# Patient Record
Sex: MALE | Race: WHITE
[De-identification: names, ages, dates, MRNs, and addresses within clinical notes are randomized per-mention and may not be internally consistent; named-entity substitution may affect disease eponyms.]

---

## 2020-08-12 ENCOUNTER — HOSPITAL ENCOUNTER (INPATIENT)
Dept: HOSPITAL 7 - FB.MS | Age: 50
LOS: 33 days | Discharge: HOME | DRG: 638 | End: 2020-09-14
Attending: FAMILY MEDICINE | Admitting: FAMILY MEDICINE
Payer: MEDICARE

## 2020-08-12 DIAGNOSIS — E11.42: ICD-10-CM

## 2020-08-12 DIAGNOSIS — I96: ICD-10-CM

## 2020-08-12 DIAGNOSIS — N17.9: ICD-10-CM

## 2020-08-12 DIAGNOSIS — Z89.421: ICD-10-CM

## 2020-08-12 DIAGNOSIS — M86.8X7: ICD-10-CM

## 2020-08-12 DIAGNOSIS — F17.210: ICD-10-CM

## 2020-08-12 DIAGNOSIS — I10: ICD-10-CM

## 2020-08-12 DIAGNOSIS — E11.52: ICD-10-CM

## 2020-08-12 DIAGNOSIS — Z79.4: ICD-10-CM

## 2020-08-12 DIAGNOSIS — E11.69: Primary | ICD-10-CM

## 2020-08-12 DIAGNOSIS — E11.51: ICD-10-CM

## 2020-08-12 DIAGNOSIS — F10.21: ICD-10-CM

## 2020-08-12 DIAGNOSIS — Z79.899: ICD-10-CM

## 2020-08-12 DIAGNOSIS — Z91.19: ICD-10-CM

## 2020-08-12 DIAGNOSIS — J44.9: ICD-10-CM

## 2020-08-12 RX ADMIN — INSULIN GLARGINE SCH UNITS: 100 INJECTION, SOLUTION SUBCUTANEOUS at 20:57

## 2020-08-12 RX ADMIN — HEPARIN SODIUM (PORCINE) LOCK FLUSH IV SOLN 100 UNIT/ML SCH UNITS: 100 SOLUTION at 18:51

## 2020-08-12 NOTE — PCM.HP.2
H&P History of Present Illness





- General


Date of Service: 08/12/20


Admit Problem/Dx: 


                           Admission Diagnosis/Problem





Admission Diagnosis/Problem      Osteomyelitis of toe of right foot








Source of Information: Patient, Old Records, Provider





- History of Present Illness


Initial Comments - Free Text/Narative: 


Shyam present for swing bed admission for right 2nd toe osteomyelitis for IV 

antibiotics until Sept 14. He was admitted to [facility] on Aug 1st-12th, had

cut his right 2nd toenail the week prior to admission in [place], came in with 

swollen red toe. Started on Zosyn & Vancomycin, Ciprofloxacin & Flagyl in ER. Cr

was 0.81 on admission, climbed to 1.66, Vancomycin trough was 32.7 on 8/4, was 

getting 1250 mg every 8 hours since admission, dose was held on 8/5, random 

vanco trough was 14.7, current dose of Vancomycin 1250 mg every 24 hrs was 

started on 8/6 to present. Last vancomycin trough was today before 1149 dose, 

came back at 13.4. Goal initially was 15-20, then changed to 10-15 for his renal

function. Discharge paperwork states 15-20 goal but pharmacy will verify this. 

His creatinine today was 1.4. He underwent amputation of right 2nd toe on 8/3, 

dressing changed by podiatry today, has appt with them on 8/18 at 840am, 

dressing to stay in place until seen by podiatry. He is weight bearing with 

postop shoe. He was evaluated by PT/OT did not require any services. Follow up 

with Infectious disease on 9/2 at 1420. Also has nephrology appt on 8/19 at 1130

am and family medicine follow up on 8/19 at 1345. His last bowel movement was 

today. Eating and drinking well. Blood pressures were uncontrolled initially in 

[place] but controlled with Amlodipine and Coreg on discharge. Had PICC line 

placed at discharge by Infectious disease. Pain is controlled with 

Hydrocodone/APAP. 





- Related Data


Allergies/Adverse Reactions: 


                                    Allergies











Allergy/AdvReac Type Severity Reaction Status Date / Time


 


No Known Allergies Allergy   Verified 08/12/20 17:18











Home Medications: 


                                    Home Meds





Albuterol Sulfate [Albuterol Sulfate Hfa] 1 puff INH Q4H PRN 08/12/20 [History]


Cefepime [Maxipime] 2 gm IVPUSH Q8H 08/12/20 [History]


Docusate Sodium/Sennosides [Senokot-S] 1 tab PO BID 08/12/20 [History]


Heparin Sodium,Porcine/PF [Heparin Lock Flush 100 Unit/ml] 300 unit IV 

ASDIRECTED PRN 08/12/20 [History]


Hydrocodone/Acetaminophen [Hydrocodon-Acetaminophen 5-325] 1 tab PO Q6H PRN 

08/12/20 [History]


Insulin Glarg,Human.Rec.Analog [Lantus Solostar] 15 unit SQ BID 08/12/20 

[History]


Vancomycin/0.9 % Sod Chloride [Vanco 1.25 gm/250 ml-0.9% NaCl] 1.25 gm IV Q24H 

08/12/20 [History]


amLODIPine Besylate [Amlodipine Besylate] 10 mg PO DAILY 08/12/20 [History]


carvediloL [Coreg] 25 mg PO BIDMEALS 08/12/20 [History]


metroNIDAZOLE [Metronidazole] 500 mg PO TID 08/12/20 [History]


polyethylene glycoL 3350 [MiraLAX] 17 gm PO DAILY 08/12/20 [History]











Past Medical History


Endocrine/Metabolic History: Reports: Diabetes, Type II





- Past Surgical History


Musculoskeletal Surgical History: Reports: Amputation (right 2nd toe 8/3/2020)





Social & Family History





- Tobacco Use


Smoking Status *Q: Current Every Day Smoker





- Alcohol Use


Alcohol Use History: Yes





H&P Review of Systems





- Review of Systems:


Review Of Systems: See Below


General: Reports: No Symptoms


HEENT: Reports: No Symptoms


Pulmonary: Reports: No Symptoms


Cardiovascular: Reports: No Symptoms


Gastrointestinal: Reports: No Symptoms


Genitourinary: Reports: No Symptoms


Musculoskeletal: Reports: Foot Pain, Joint Pain (hands)


Skin: Reports: Wound (right toe)


Psychiatric: Reports: No Symptoms


Neurological: Reports: No Symptoms


Hematologic/Lymphatic: Reports: No Symptoms


Immunologic: Reports: No Symptoms





Exam





- Exam


Exam: See Below





- Exam


General: Alert, Oriented, Cooperative.  No: Mild Distress


HEENT: PERRLA, Conjunctiva Clear, EOMI, Hearing Intact, Posterior Pharynx Clear


Neck: Supple, Trachea Midline


Lungs: Clear to Auscultation, Normal Respiratory Effort


Cardiovascular: Regular Rate, Regular Rhythm


GI/Abdominal Exam: Normal Bowel Sounds, Soft, Non-Tender, No Distention


 (Male) Exam: Deferred


Rectal (Males) Exam: Deferred


Extremities: No Pedal Edema, Other (post-op shoe on right, dressing in place.)


Peripheral Pulses: 2+: Radial (L), Radial (R)


Skin: Wound (right 2nd toe)


Psychiatric: Normal Affect, Normal Mood





*Q Meaningful Use (ADM)





- VTE Risk Assess *Q


Each Risk Factor Represents 1 Point: Age 41 - 59 years, History of prior major 

surgery less than 1 month


Total Score 1 Point Risk Factors: 2


Each Risk Factor Represents 2 Points: None


Total Score 2 Point Risk Factors: 0


Each Risk Factor Represents 3 Points: None


Total Score 3 Point Risk Factors: 0


Each Risk Factor Represents 5 Points: None


Total Score 5 Point Risk Factors: 0


Venous Thromboembolism Risk Factor Score *Q: 2





- Problem List


(1) Osteomyelitis of second toe of right foot


SNOMED Code(s): 567997554, 548057142, 6817301940615266


   ICD Code: M86.9 - OSTEOMYELITIS, UNSPECIFIED   Status: Acute   Current Visit:

 Yes   





(2) Status post amputation of toe of right foot


SNOMED Code(s): 03969971833010399, 52953786807747082


   ICD Code: Z89.421 - ACQUIRED ABSENCE OF OTHER RIGHT TOE(S)   Status: Acute   

Current Visit: Yes   Onset Date: ~08/03/20   





(3) Diabetes


SNOMED Code(s): 16356455


   ICD Code: E11.9 - TYPE 2 DIABETES MELLITUS WITHOUT COMPLICATIONS   Status: 

Chronic   Current Visit: Yes   


Qualifiers: 


   Diabetes mellitus type: type 2   Diabetes mellitus long term insulin use: 

unspecified long term insulin use status 





(4) History of alcohol dependence


SNOMED Code(s): 067798290


   ICD Code: F10.21 - ALCOHOL DEPENDENCE, IN REMISSION   Status: Chronic   

Current Visit: Yes   





(5) Acute kidney injury


SNOMED Code(s): 48380712, 38912617


   ICD Code: N17.9 - ACUTE KIDNEY FAILURE, UNSPECIFIED   Status: Acute   Current

 Visit: Yes   Onset Date: ~08/04/20   





(6) Hypertension


SNOMED Code(s): 48382874


   ICD Code: I10 - ESSENTIAL (PRIMARY) HYPERTENSION   Status: Chronic   Current 

Visit: Yes   


Problem List Initiated/Reviewed/Updated: Yes


Orders Last 24hrs: 


                               Active Orders 24 hr











 Category Date Time Status


 


 Patient Status [ADT] Routine ADT  08/12/20 16:30 Active


 


 Accu Check [Blood Glucose Check, Bedside] [RC] BIDMEALS Care  08/12/20 16:37 

Active


 


 Ambulate [RC] PER UNIT ROUTINE Care  08/12/20 16:32 Active


 


 Height and Weight [RC] WEEKLY Care  08/12/20 16:30 Active


 


 Oxygen Therapy [RC] PRN Care  08/12/20 16:30 Active


 


 RT Post Treatment Assessment [RC] Click to Edit Care  08/12/20 16:48 Ordered


 


 Up ad [place] [RC] ASDIRECTED Care  08/12/20 16:30 Active


 


 VTE/DVT Education [RC] Per Unit Routine Care  08/12/20 16:30 Active


 


 Vital Signs [RC] PER UNIT ROUTINE Care  08/12/20 16:30 Active


 


 Consistent Carbohydrate Diet [DIET] Diet  08/12/20 Dinner Active


 


 ALANINE AMINOTRANSFERASE,ALT [CHEM] WEEKLY Lab  08/17/20 06:00 Ordered


 


 ALANINE AMINOTRANSFERASE,ALT [CHEM] WEEKLY Lab  08/24/20 06:00 Ordered


 


 ALANINE AMINOTRANSFERASE,ALT [CHEM] WEEKLY Lab  08/31/20 06:00 Ordered


 


 ALANINE AMINOTRANSFERASE,ALT [CHEM] WEEKLY Lab  09/07/20 06:00 Ordered


 


 ALANINE AMINOTRANSFERASE,ALT [CHEM] WEEKLY Lab  09/14/20 06:00 Ordered


 


 C-REACTIVE PROTEIN [CHEM] WEEKLY Lab  08/17/20 06:00 Ordered


 


 C-REACTIVE PROTEIN [CHEM] WEEKLY Lab  08/24/20 06:00 Ordered


 


 C-REACTIVE PROTEIN [CHEM] WEEKLY Lab  08/31/20 06:00 Ordered


 


 C-REACTIVE PROTEIN [CHEM] WEEKLY Lab  09/07/20 06:00 Ordered


 


 C-REACTIVE PROTEIN [CHEM] WEEKLY Lab  09/14/20 06:00 Ordered


 


 CBC WITH AUTO DIFF [HEME] WEEKLY Lab  08/17/20 06:00 Ordered


 


 CBC WITH AUTO DIFF [HEME] WEEKLY Lab  08/24/20 06:00 Ordered


 


 CBC WITH AUTO DIFF [HEME] WEEKLY Lab  08/31/20 06:00 Ordered


 


 CBC WITH AUTO DIFF [HEME] WEEKLY Lab  09/07/20 06:00 Ordered


 


 CBC WITH AUTO DIFF [HEME] WEEKLY Lab  09/14/20 06:00 Ordered


 


 CREATININE, URINE WEEKLY Lab  08/17/20 06:00 Ordered


 


 CREATININE, URINE WEEKLY Lab  08/24/20 06:00 Ordered


 


 CREATININE, URINE WEEKLY Lab  08/31/20 06:00 Ordered


 


 CREATININE, URINE WEEKLY Lab  09/07/20 06:00 Ordered


 


 CREATININE, URINE WEEKLY Lab  09/14/20 06:00 Ordered


 


 SEDIMENTATION RATE MANUAL [HEME] WEEKLY Lab  08/17/20 06:00 Ordered


 


 SEDIMENTATION RATE MANUAL [HEME] WEEKLY Lab  08/24/20 06:00 Ordered


 


 SEDIMENTATION RATE MANUAL [HEME] WEEKLY Lab  08/31/20 06:00 Ordered


 


 SEDIMENTATION RATE MANUAL [HEME] WEEKLY Lab  09/07/20 06:00 Ordered


 


 SEDIMENTATION RATE MANUAL [HEME] WEEKLY Lab  09/14/20 06:00 Ordered


 


 Acetaminophen/HYDROcodone [Norco 325-5 MG] Med  08/12/20 16:47 Ordered





 1 tab PO Q6H PRN   


 


 Albuterol [Ventolin HFA] Med  08/12/20 16:47 Ordered





 1 puff INH Q4H PRN   


 


 Cefepime [Maxipime] Med  08/12/20 17:00 Ordered





 2 gm IVPUSH Q8H   


 


 Docusate Sodium/Sennosides [Senna Plus] Med  08/12/20 21:00 Ordered





 1 tab PO BID   


 


 Heparin Sodium [Heparin Lock Flush 100 Units/ML] Med  08/12/20 16:57 Active





 300 units IVPUSH ASDIRECTED PRN   


 


 Heparin Sodium [Heparin Lock Flush 100 Units/ML] Med  08/13/20 12:00 Active





 300 units IVPUSH Q24H   


 


 Heparin Sodium,Porcine/PF [Heparin Lock Flush 100 Unit/ Med  08/12/20 16:47 

Ordered





 ml]   





 300 unit IV ASDIRECTED PRN   


 


 Insulin Glarg,Human.Rec.Analog [LantUS Solostar] Med  08/12/20 21:00 Ordered





 15 units SUBCUT BID   


 


 Vancomycin 1.25 gm Med  08/13/20 12:00 Active





 Sodium Chloride 0.9% [Normal Saline (AdvBag)] 250 ml   





 IV Q24H   


 


 amLODIPine [Norvasc] Med  08/13/20 09:00 Ordered





 10 mg PO DAILY   


 


 carvediloL [Coreg] Med  08/12/20 18:00 Ordered





 25 mg PO BIDMEALS   


 


 metroNIDAZOLE [Flagyl] Med  08/12/20 16:32 Ordered





 500 mg PO TID   


 


 polyethylene glycoL 3350 [MiraLAX] Med  08/13/20 09:00 Ordered





 17 gm PO DAILY   


 


 Resuscitation Status Routine Resus Stat  08/12/20 16:30 Ordered








                                Medication Orders





Hydrocodone Bitart/Acetaminophen (Norco 325-5 Mg)  1 tab PO Q6H PRN


   PRN Reason: Pain (severe 7-10)


Albuterol (Ventolin Hfa)  0 gm INH Q4H PRN


   PRN Reason: SHORTNESS OF BREATH


Amlodipine Besylate (Norvasc)  10 mg PO DAILY [facility]


Carvedilol (Coreg)  25 mg PO BIDMEALS [facility]


Cefepime HCl (Maxipime)  2 gm IVPUSH Q8H [facility]


Heparin Sodium (Porcine) (Heparin Lock Flush 100 Units/Ml)  300 units IVPUSH Q8H

 [facility]


Heparin Sodium (Porcine) (Heparin Lock Flush 100 Units/Ml)  300 units IVPUSH 

Q24H [facility]


Heparin Sodium (Porcine) (Heparin Lock Flush 100 Units/Ml)  300 units IVPUSH 

ASDIRECTED PRN


   PRN Reason: FLUSH


Vancomycin HCl 1.25 gm/ Sodium (Chloride)  250 mls @ 125 mls/hr IV Q24H [facility]


Insulin Glargine (Lantus Solostar)  15 units SUBCUT BID [facility]


Metronidazole (Flagyl)  500 mg PO Q8H [facility]


Polyethylene Glycol (Miralax)  17 gm PO DAILY [facility]


Senna/Docusate Sodium (Senna Plus)  1 tab PO BID [facility]








Assessment/Plan Comment:: 


1. Admit to swingbed for IV Vancomycin 1250 mg q24h, dosing per pharmacy 

protocol; Cefepime 2 gram IV q8h and Flagyl 500 mg po q8hr until Sept 14.


2. CBC, Creatinine, ALT, ESR, & CRP weekly on Monday, fax to Infectious Disease.


3. Vanco trough tomorrow before next dose and pharmacy will verify with [place] on

 goal ranges as two different ones are listed.


4. Consistent Carb diet, Accuchecks bid. Lantus 15 units bid.


5. Weightbearing with post-op shoe, activity up ad sakina.


6. Dressing change next week with podiatry. Appts listed in HPI and in physical 

chart. [place] is going to try and coordinate his appointments for one 

day instead of have 1 on 18th and 2 on 19th. 


7. DVT prophylaxis: ambulate.


8. FULL CODE.


9. Adjust treatments as necessary.








- Mortality Measure


Prognosis:: Good

## 2020-08-13 RX ADMIN — HEPARIN SODIUM (PORCINE) LOCK FLUSH IV SOLN 100 UNIT/ML SCH UNITS: 100 SOLUTION at 17:29

## 2020-08-13 RX ADMIN — INSULIN GLARGINE SCH UNITS: 100 INJECTION, SOLUTION SUBCUTANEOUS at 21:30

## 2020-08-13 RX ADMIN — HEPARIN SODIUM (PORCINE) LOCK FLUSH IV SOLN 100 UNIT/ML SCH UNITS: 100 SOLUTION at 08:52

## 2020-08-13 RX ADMIN — INSULIN GLARGINE SCH UNITS: 100 INJECTION, SOLUTION SUBCUTANEOUS at 08:42

## 2020-08-13 RX ADMIN — Medication PRN ML: at 14:29

## 2020-08-13 RX ADMIN — HEPARIN SODIUM (PORCINE) LOCK FLUSH IV SOLN 100 UNIT/ML SCH UNITS: 100 SOLUTION at 01:59

## 2020-08-13 RX ADMIN — Medication PRN ML: at 02:01

## 2020-08-13 RX ADMIN — Medication PRN ML: at 08:53

## 2020-08-14 RX ADMIN — HEPARIN SODIUM (PORCINE) LOCK FLUSH IV SOLN 100 UNIT/ML SCH UNITS: 100 SOLUTION at 14:08

## 2020-08-14 RX ADMIN — INSULIN GLARGINE SCH UNITS: 100 INJECTION, SOLUTION SUBCUTANEOUS at 09:19

## 2020-08-14 RX ADMIN — Medication PRN ML: at 17:58

## 2020-08-14 RX ADMIN — Medication PRN ML: at 14:07

## 2020-08-14 RX ADMIN — HEPARIN SODIUM (PORCINE) LOCK FLUSH IV SOLN 100 UNIT/ML SCH UNITS: 100 SOLUTION at 17:58

## 2020-08-14 RX ADMIN — INSULIN GLARGINE SCH UNITS: 100 INJECTION, SOLUTION SUBCUTANEOUS at 21:31

## 2020-08-14 RX ADMIN — Medication PRN ML: at 00:51

## 2020-08-14 RX ADMIN — Medication PRN ML: at 12:32

## 2020-08-14 RX ADMIN — HEPARIN SODIUM (PORCINE) LOCK FLUSH IV SOLN 100 UNIT/ML SCH UNITS: 100 SOLUTION at 09:16

## 2020-08-14 RX ADMIN — Medication PRN ML: at 09:01

## 2020-08-14 RX ADMIN — HEPARIN SODIUM (PORCINE) LOCK FLUSH IV SOLN 100 UNIT/ML SCH UNITS: 100 SOLUTION at 00:49

## 2020-08-15 RX ADMIN — Medication PRN ML: at 00:22

## 2020-08-15 RX ADMIN — HEPARIN SODIUM (PORCINE) LOCK FLUSH IV SOLN 100 UNIT/ML SCH UNITS: 100 SOLUTION at 17:43

## 2020-08-15 RX ADMIN — HEPARIN SODIUM (PORCINE) LOCK FLUSH IV SOLN 100 UNIT/ML SCH UNITS: 100 SOLUTION at 01:01

## 2020-08-15 RX ADMIN — Medication PRN ML: at 14:36

## 2020-08-15 RX ADMIN — INSULIN GLARGINE SCH UNITS: 100 INJECTION, SOLUTION SUBCUTANEOUS at 08:04

## 2020-08-15 RX ADMIN — HEPARIN SODIUM (PORCINE) LOCK FLUSH IV SOLN 100 UNIT/ML SCH UNITS: 100 SOLUTION at 16:08

## 2020-08-15 RX ADMIN — Medication PRN ML: at 16:08

## 2020-08-15 RX ADMIN — HEPARIN SODIUM (PORCINE) LOCK FLUSH IV SOLN 100 UNIT/ML SCH UNITS: 100 SOLUTION at 08:16

## 2020-08-15 RX ADMIN — Medication PRN ML: at 17:43

## 2020-08-15 RX ADMIN — INSULIN GLARGINE SCH UNITS: 100 INJECTION, SOLUTION SUBCUTANEOUS at 21:33

## 2020-08-15 RX ADMIN — Medication PRN ML: at 17:39

## 2020-08-15 RX ADMIN — Medication PRN ML: at 08:10

## 2020-08-16 RX ADMIN — Medication PRN ML: at 08:56

## 2020-08-16 RX ADMIN — Medication PRN ML: at 17:44

## 2020-08-16 RX ADMIN — Medication PRN ML: at 09:00

## 2020-08-16 RX ADMIN — Medication PRN ML: at 13:04

## 2020-08-16 RX ADMIN — HEPARIN SODIUM (PORCINE) LOCK FLUSH IV SOLN 100 UNIT/ML SCH UNITS: 100 SOLUTION at 09:00

## 2020-08-16 RX ADMIN — Medication PRN ML: at 17:38

## 2020-08-16 RX ADMIN — Medication PRN ML: at 09:03

## 2020-08-16 RX ADMIN — INSULIN GLARGINE SCH UNITS: 100 INJECTION, SOLUTION SUBCUTANEOUS at 08:55

## 2020-08-16 RX ADMIN — Medication PRN ML: at 00:48

## 2020-08-16 RX ADMIN — Medication PRN ML: at 14:45

## 2020-08-16 RX ADMIN — HEPARIN SODIUM (PORCINE) LOCK FLUSH IV SOLN 100 UNIT/ML SCH UNITS: 100 SOLUTION at 14:45

## 2020-08-16 RX ADMIN — INSULIN GLARGINE SCH UNITS: 100 INJECTION, SOLUTION SUBCUTANEOUS at 21:21

## 2020-08-16 RX ADMIN — HEPARIN SODIUM (PORCINE) LOCK FLUSH IV SOLN 100 UNIT/ML SCH UNITS: 100 SOLUTION at 17:44

## 2020-08-16 RX ADMIN — HEPARIN SODIUM (PORCINE) LOCK FLUSH IV SOLN 100 UNIT/ML SCH UNITS: 100 SOLUTION at 00:52

## 2020-08-17 RX ADMIN — INSULIN GLARGINE SCH UNITS: 100 INJECTION, SOLUTION SUBCUTANEOUS at 20:53

## 2020-08-17 RX ADMIN — HEPARIN SODIUM (PORCINE) LOCK FLUSH IV SOLN 100 UNIT/ML SCH UNITS: 100 SOLUTION at 13:15

## 2020-08-17 RX ADMIN — Medication PRN ML: at 00:19

## 2020-08-17 RX ADMIN — INSULIN GLARGINE SCH UNITS: 100 INJECTION, SOLUTION SUBCUTANEOUS at 08:39

## 2020-08-17 RX ADMIN — Medication PRN ML: at 12:50

## 2020-08-17 RX ADMIN — HEPARIN SODIUM (PORCINE) LOCK FLUSH IV SOLN 100 UNIT/ML SCH UNITS: 100 SOLUTION at 00:20

## 2020-08-17 RX ADMIN — HEPARIN SODIUM (PORCINE) LOCK FLUSH IV SOLN 100 UNIT/ML SCH UNITS: 100 SOLUTION at 08:49

## 2020-08-17 RX ADMIN — Medication PRN ML: at 08:49

## 2020-08-17 RX ADMIN — Medication PRN ML: at 17:26

## 2020-08-17 RX ADMIN — HEPARIN SODIUM (PORCINE) LOCK FLUSH IV SOLN 100 UNIT/ML SCH UNITS: 100 SOLUTION at 17:30

## 2020-08-18 RX ADMIN — Medication PRN ML: at 17:49

## 2020-08-18 RX ADMIN — HEPARIN SODIUM (PORCINE) LOCK FLUSH IV SOLN 100 UNIT/ML SCH UNITS: 100 SOLUTION at 13:50

## 2020-08-18 RX ADMIN — INSULIN GLARGINE SCH UNITS: 100 INJECTION, SOLUTION SUBCUTANEOUS at 08:17

## 2020-08-18 RX ADMIN — INSULIN GLARGINE SCH UNITS: 100 INJECTION, SOLUTION SUBCUTANEOUS at 21:33

## 2020-08-18 RX ADMIN — HEPARIN SODIUM (PORCINE) LOCK FLUSH IV SOLN 100 UNIT/ML SCH UNITS: 100 SOLUTION at 01:16

## 2020-08-18 RX ADMIN — HEPARIN SODIUM (PORCINE) LOCK FLUSH IV SOLN 100 UNIT/ML SCH UNITS: 100 SOLUTION at 17:52

## 2020-08-18 RX ADMIN — Medication PRN ML: at 13:50

## 2020-08-18 RX ADMIN — HEPARIN SODIUM (PORCINE) LOCK FLUSH IV SOLN 100 UNIT/ML SCH UNITS: 100 SOLUTION at 08:19

## 2020-08-19 RX ADMIN — INSULIN GLARGINE SCH UNITS: 100 INJECTION, SOLUTION SUBCUTANEOUS at 20:49

## 2020-08-19 RX ADMIN — Medication PRN ML: at 17:12

## 2020-08-19 RX ADMIN — HEPARIN SODIUM (PORCINE) LOCK FLUSH IV SOLN 100 UNIT/ML SCH UNITS: 100 SOLUTION at 09:20

## 2020-08-19 RX ADMIN — Medication PRN ML: at 15:22

## 2020-08-19 RX ADMIN — Medication PRN ML: at 12:43

## 2020-08-19 RX ADMIN — INSULIN GLARGINE SCH UNITS: 100 INJECTION, SOLUTION SUBCUTANEOUS at 09:21

## 2020-08-19 RX ADMIN — Medication PRN ML: at 00:59

## 2020-08-19 RX ADMIN — Medication PRN ML: at 09:15

## 2020-08-19 RX ADMIN — Medication PRN ML: at 09:20

## 2020-08-19 RX ADMIN — HEPARIN SODIUM (PORCINE) LOCK FLUSH IV SOLN 100 UNIT/ML SCH UNITS: 100 SOLUTION at 17:11

## 2020-08-19 RX ADMIN — HEPARIN SODIUM (PORCINE) LOCK FLUSH IV SOLN 100 UNIT/ML SCH UNITS: 100 SOLUTION at 15:22

## 2020-08-19 RX ADMIN — HEPARIN SODIUM (PORCINE) LOCK FLUSH IV SOLN 100 UNIT/ML SCH UNITS: 100 SOLUTION at 00:59

## 2020-08-20 RX ADMIN — Medication PRN ML: at 16:40

## 2020-08-20 RX ADMIN — HEPARIN SODIUM (PORCINE) LOCK FLUSH IV SOLN 100 UNIT/ML SCH UNITS: 100 SOLUTION at 00:21

## 2020-08-20 RX ADMIN — Medication PRN ML: at 08:14

## 2020-08-20 RX ADMIN — Medication PRN ML: at 00:20

## 2020-08-20 RX ADMIN — HEPARIN SODIUM (PORCINE) LOCK FLUSH IV SOLN 100 UNIT/ML SCH UNITS: 100 SOLUTION at 08:12

## 2020-08-20 RX ADMIN — HEPARIN SODIUM (PORCINE) LOCK FLUSH IV SOLN 100 UNIT/ML SCH UNITS: 100 SOLUTION at 16:40

## 2020-08-20 RX ADMIN — INSULIN GLARGINE SCH UNITS: 100 INJECTION, SOLUTION SUBCUTANEOUS at 08:13

## 2020-08-20 RX ADMIN — Medication PRN ML: at 14:57

## 2020-08-20 RX ADMIN — HEPARIN SODIUM (PORCINE) LOCK FLUSH IV SOLN 100 UNIT/ML SCH UNITS: 100 SOLUTION at 14:57

## 2020-08-20 RX ADMIN — INSULIN GLARGINE SCH UNITS: 100 INJECTION, SOLUTION SUBCUTANEOUS at 20:41

## 2020-08-20 RX ADMIN — Medication PRN ML: at 13:13

## 2020-08-21 RX ADMIN — Medication PRN ML: at 00:16

## 2020-08-21 RX ADMIN — INSULIN GLARGINE SCH UNITS: 100 INJECTION, SOLUTION SUBCUTANEOUS at 21:23

## 2020-08-21 RX ADMIN — Medication PRN ML: at 14:12

## 2020-08-21 RX ADMIN — INSULIN GLARGINE SCH UNITS: 100 INJECTION, SOLUTION SUBCUTANEOUS at 08:41

## 2020-08-21 RX ADMIN — HEPARIN SODIUM (PORCINE) LOCK FLUSH IV SOLN 100 UNIT/ML SCH UNITS: 100 SOLUTION at 14:11

## 2020-08-21 RX ADMIN — HEPARIN SODIUM (PORCINE) LOCK FLUSH IV SOLN 100 UNIT/ML SCH UNITS: 100 SOLUTION at 17:56

## 2020-08-21 RX ADMIN — HEPARIN SODIUM (PORCINE) LOCK FLUSH IV SOLN 100 UNIT/ML SCH UNITS: 100 SOLUTION at 00:16

## 2020-08-21 RX ADMIN — Medication PRN ML: at 17:50

## 2020-08-21 RX ADMIN — HEPARIN SODIUM (PORCINE) LOCK FLUSH IV SOLN 100 UNIT/ML SCH UNITS: 100 SOLUTION at 08:55

## 2020-08-21 RX ADMIN — Medication PRN ML: at 08:54

## 2020-08-22 RX ADMIN — Medication PRN ML: at 13:31

## 2020-08-22 RX ADMIN — INSULIN GLARGINE SCH UNITS: 100 INJECTION, SOLUTION SUBCUTANEOUS at 21:32

## 2020-08-22 RX ADMIN — HEPARIN SODIUM (PORCINE) LOCK FLUSH IV SOLN 100 UNIT/ML SCH UNITS: 100 SOLUTION at 01:40

## 2020-08-22 RX ADMIN — HEPARIN SODIUM (PORCINE) LOCK FLUSH IV SOLN 100 UNIT/ML SCH UNITS: 100 SOLUTION at 17:28

## 2020-08-22 RX ADMIN — INSULIN GLARGINE SCH UNITS: 100 INJECTION, SOLUTION SUBCUTANEOUS at 09:02

## 2020-08-22 RX ADMIN — Medication PRN ML: at 01:27

## 2020-08-22 RX ADMIN — HEPARIN SODIUM (PORCINE) LOCK FLUSH IV SOLN 100 UNIT/ML SCH UNITS: 100 SOLUTION at 13:31

## 2020-08-22 RX ADMIN — Medication PRN ML: at 17:18

## 2020-08-22 RX ADMIN — Medication PRN ML: at 09:10

## 2020-08-22 RX ADMIN — Medication PRN ML: at 01:39

## 2020-08-22 RX ADMIN — HEPARIN SODIUM (PORCINE) LOCK FLUSH IV SOLN 100 UNIT/ML SCH UNITS: 100 SOLUTION at 09:11

## 2020-08-23 RX ADMIN — Medication PRN ML: at 09:38

## 2020-08-23 RX ADMIN — HEPARIN SODIUM (PORCINE) LOCK FLUSH IV SOLN 100 UNIT/ML SCH UNITS: 100 SOLUTION at 14:50

## 2020-08-23 RX ADMIN — Medication PRN ML: at 00:22

## 2020-08-23 RX ADMIN — INSULIN GLARGINE SCH UNITS: 100 INJECTION, SOLUTION SUBCUTANEOUS at 08:14

## 2020-08-23 RX ADMIN — HEPARIN SODIUM (PORCINE) LOCK FLUSH IV SOLN 100 UNIT/ML SCH UNITS: 100 SOLUTION at 09:51

## 2020-08-23 RX ADMIN — Medication PRN ML: at 14:49

## 2020-08-23 RX ADMIN — HEPARIN SODIUM (PORCINE) LOCK FLUSH IV SOLN 100 UNIT/ML SCH UNITS: 100 SOLUTION at 00:22

## 2020-08-23 RX ADMIN — Medication PRN ML: at 17:27

## 2020-08-23 RX ADMIN — INSULIN GLARGINE SCH UNITS: 100 INJECTION, SOLUTION SUBCUTANEOUS at 21:01

## 2020-08-23 RX ADMIN — HEPARIN SODIUM (PORCINE) LOCK FLUSH IV SOLN 100 UNIT/ML SCH UNITS: 100 SOLUTION at 17:39

## 2020-08-23 NOTE — PCM.PN
- General Info


Date of Service: 08/23/20


Admission Dx/Problem (Free Text): 


Blood pressure medication. I looked on the chart and he was not on it before. 

Blood pressures are actually running low and is refuses today and his blood 

pressure is good. He says his pain is under control with some Tylenol. Does not 

want hydrocodone. He's overall doing well. No fevers or chills.








- Patient Data


Vitals - Most Recent: 


                                Last Vital Signs











Temp  98.1 F   08/22/20 08:00


 


Pulse  79   08/23/20 08:13


 


Resp  16   08/22/20 08:00


 


BP  134/89   08/23/20 08:14


 


Pulse Ox  98   08/22/20 16:00











Weight - Most Recent: 181 lb 1 oz


Lab Results Last 24 Hours: 


                         Laboratory Results - last 24 hr











  08/22/20 08/22/20 Range/Units





  07:27 11:47 


 


POC Glucose  194 H  277 H  ()  mg/dL











Med Orders - Current: 


                               Current Medications





Acetaminophen (Tylenol)  650 mg PO Q4H PRN


   PRN Reason: Pain (moderate 4-6)


   Last Admin: 08/23/20 00:24 Dose:  650 mg


   Documented by: 


Albuterol (Ventolin Hfa)  0 gm INH Q4H PRN


   PRN Reason: SHORTNESS OF BREATH


Cefepime HCl (Maxipime)  2 gm IVPUSH Q8H [facility]


   Last Admin: 08/23/20 00:23 Dose:  2 gm


   Documented by: 


Heparin Sodium (Porcine) (Heparin Lock Flush 100 Units/Ml)  300 units IVPUSH Q8H

[facility]


   Last Admin: 08/23/20 00:22 Dose:  300 units


   Documented by: 


Heparin Sodium (Porcine) (Heparin Lock Flush 100 Units/Ml)  300 units IVPUSH 

ASDIRECTED PRN


   PRN Reason: FLUSH


Heparin Sodium (Porcine) (Heparin Lock Flush 100 Units/Ml)  300 units IVPUSH 

Q24H [facility]


   Last Admin: 08/22/20 13:31 Dose:  300 units


   Documented by: 


Vancomycin HCl (Vancomycin 1.5 Gm/300 Ml Premix)  300 mls @ 200 mls/hr IV Q24H 

[facility]


   Last Admin: 08/22/20 11:49 Dose:  200 mls/hr


   Documented by: 


Insulin Glargine (Lantus Solostar)  15 units SUBCUT BID [facility]


   Last Admin: 08/23/20 08:14 Dose:  15 units


   Documented by: 


Metronidazole (Flagyl)  500 mg PO Q8H [facility]


   Last Admin: 08/23/20 08:15 Dose:  500 mg


   Documented by: 


Polyethylene Glycol (Miralax)  17 gm PO DAILY PRN


   PRN Reason: Constipation


Senna/Docusate Sodium (Senna Plus)  1 tab PO BID PRN


   PRN Reason: Constipation


Sodium Chloride (Saline Flush)  10 ml FLUSH ASDIRECTED PRN


   PRN Reason: IV Use


   Last Admin: 08/23/20 00:22 Dose:  10 ml


   Documented by: 


Vancomycin HCl (Pharmacy To Dose - Vancomycin)  1 dose .XX ASDIRECTED [facility]





Discontinued Medications





Hydrocodone Bitart/Acetaminophen (Norco 325-5 Mg)  1 tab PO Q6H PRN


   PRN Reason: Pain (severe 7-10)


   Last Admin: 08/12/20 20:57 Dose:  1 tab


   Documented by: 


Amlodipine Besylate (Norvasc)  10 mg PO DAILY [facility]


   Last Admin: 08/23/20 08:14 Dose:  Not Given


   Documented by: 


Carvedilol (Coreg)  25 mg PO BIDMEALS [facility]


   Last Admin: 08/23/20 08:13 Dose:  Not Given


   Documented by: 


Heparin Sodium (Porcine) (Heparin Lock Flush 100 Units/Ml)  300 units IVPUSH 

Q24H [facility]


   Last Admin: 08/13/20 14:29 Dose:  300 units


   Documented by: 


Vancomycin HCl 1.25 gm/ Sodium (Chloride)  250 mls @ 125 mls/hr IV Q24H [facility]


   Last Admin: 08/13/20 12:04 Dose:  125 mls/hr


   Documented by: 


Polyethylene Glycol (Miralax)  17 gm PO DAILY [facility]


   Last Admin: 08/13/20 08:47 Dose:  Not Given


   Documented by: 


Senna/Docusate Sodium (Senna Plus)  1 tab PO BID [facility]


   Last Admin: 08/13/20 08:43 Dose:  1 tab


   Documented by: 











- Exam


General: Alert, Oriented, Cooperative


Extremities: Other (Partially amputated toe is healing nicely sutures in place 

no erythema or drainage.)





Sepsis Event Note





- Evaluation


Sepsis Screening Result: No Definite Risk





- Focused Exam


Vital Signs: 


                                   Vital Signs











  Pulse BP


 


 08/23/20 08:14   134/89


 


 08/23/20 08:13  79  134/89














- Problem List & Annotations


(1) Acute kidney injury


SNOMED Code(s): 96312342, 03007362


   Code(s): N17.9 - ACUTE KIDNEY FAILURE, UNSPECIFIED   Status: Acute   Current 

Visit: Yes   Onset Date: ~08/04/20   





(2) Osteomyelitis of second toe of right foot


SNOMED Code(s): 682724531, 939273647, 3397519036978838


   Code(s): M86.9 - OSTEOMYELITIS, UNSPECIFIED   Status: Acute   Current Visit: 

Yes   





(3) Status post amputation of toe of right foot


SNOMED Code(s): 75097883384676440, 24678837752192036


   Code(s): Z89.421 - ACQUIRED ABSENCE OF OTHER RIGHT TOE(S)   Status: Acute   

Current Visit: Yes   Onset Date: ~08/03/20   





(4) Diabetes


SNOMED Code(s): 51745597


   Code(s): E11.9 - TYPE 2 DIABETES MELLITUS WITHOUT COMPLICATIONS   Status: 

Chronic   Current Visit: Yes   


Qualifiers: 


   Diabetes mellitus type: type 2   Diabetes mellitus long term insulin use: 

unspecified long term insulin use status 





(5) History of alcohol dependence


SNOMED Code(s): 392603380


   Code(s): F10.21 - ALCOHOL DEPENDENCE, IN REMISSION   Status: Chronic   

Current Visit: Yes   





(6) Hypertension


SNOMED Code(s): 92425791


   Code(s): I10 - ESSENTIAL (PRIMARY) HYPERTENSION   Status: Chronic   Current 

Visit: Yes   





- Problem List Review


Problem List Initiated/Reviewed/Updated: Yes





- Plan


Plan:: 


1. DC amlodipine and carvedilol. Watch blood pressure closely.


2. Continue current care.

## 2020-08-24 RX ADMIN — Medication PRN ML: at 09:55

## 2020-08-24 RX ADMIN — Medication PRN ML: at 17:07

## 2020-08-24 RX ADMIN — HEPARIN SODIUM (PORCINE) LOCK FLUSH IV SOLN 100 UNIT/ML SCH UNITS: 100 SOLUTION at 17:07

## 2020-08-24 RX ADMIN — Medication PRN ML: at 00:42

## 2020-08-24 RX ADMIN — HEPARIN SODIUM (PORCINE) LOCK FLUSH IV SOLN 100 UNIT/ML SCH UNITS: 100 SOLUTION at 09:55

## 2020-08-24 RX ADMIN — HEPARIN SODIUM (PORCINE) LOCK FLUSH IV SOLN 100 UNIT/ML SCH UNITS: 100 SOLUTION at 14:04

## 2020-08-24 RX ADMIN — INSULIN GLARGINE SCH UNITS: 100 INJECTION, SOLUTION SUBCUTANEOUS at 08:11

## 2020-08-24 RX ADMIN — INSULIN GLARGINE SCH UNITS: 100 INJECTION, SOLUTION SUBCUTANEOUS at 21:29

## 2020-08-24 RX ADMIN — HEPARIN SODIUM (PORCINE) LOCK FLUSH IV SOLN 100 UNIT/ML SCH UNITS: 100 SOLUTION at 00:43

## 2020-08-25 RX ADMIN — VANCOMYCIN HYDROCHLORIDE SCH MLS/HR: 1 INJECTION, SOLUTION INTRAVENOUS at 12:37

## 2020-08-25 RX ADMIN — INSULIN GLARGINE SCH UNITS: 100 INJECTION, SOLUTION SUBCUTANEOUS at 09:13

## 2020-08-25 RX ADMIN — HEPARIN SODIUM (PORCINE) LOCK FLUSH IV SOLN 100 UNIT/ML SCH UNITS: 100 SOLUTION at 17:17

## 2020-08-25 RX ADMIN — Medication PRN ML: at 00:31

## 2020-08-25 RX ADMIN — HEPARIN SODIUM (PORCINE) LOCK FLUSH IV SOLN 100 UNIT/ML SCH UNITS: 100 SOLUTION at 01:42

## 2020-08-25 RX ADMIN — HEPARIN SODIUM (PORCINE) LOCK FLUSH IV SOLN 100 UNIT/ML SCH UNITS: 100 SOLUTION at 09:15

## 2020-08-25 RX ADMIN — Medication PRN ML: at 17:17

## 2020-08-25 RX ADMIN — Medication PRN ML: at 09:14

## 2020-08-25 RX ADMIN — HEPARIN SODIUM (PORCINE) LOCK FLUSH IV SOLN 100 UNIT/ML SCH UNITS: 100 SOLUTION at 13:41

## 2020-08-25 RX ADMIN — Medication PRN ML: at 02:03

## 2020-08-25 RX ADMIN — INSULIN GLARGINE SCH UNITS: 100 INJECTION, SOLUTION SUBCUTANEOUS at 20:52

## 2020-08-25 RX ADMIN — Medication PRN ML: at 13:41

## 2020-08-25 RX ADMIN — VANCOMYCIN HYDROCHLORIDE SCH MLS/HR: 1 INJECTION, SOLUTION INTRAVENOUS at 00:32

## 2020-08-26 RX ADMIN — INSULIN GLARGINE SCH UNITS: 100 INJECTION, SOLUTION SUBCUTANEOUS at 21:00

## 2020-08-26 RX ADMIN — VANCOMYCIN HYDROCHLORIDE SCH MLS/HR: 1 INJECTION, SOLUTION INTRAVENOUS at 13:30

## 2020-08-26 RX ADMIN — HEPARIN SODIUM (PORCINE) LOCK FLUSH IV SOLN 100 UNIT/ML SCH UNITS: 100 SOLUTION at 16:13

## 2020-08-26 RX ADMIN — HEPARIN SODIUM (PORCINE) LOCK FLUSH IV SOLN 100 UNIT/ML SCH UNITS: 100 SOLUTION at 01:08

## 2020-08-26 RX ADMIN — HEPARIN SODIUM (PORCINE) LOCK FLUSH IV SOLN 100 UNIT/ML SCH UNITS: 100 SOLUTION at 13:30

## 2020-08-26 RX ADMIN — INSULIN GLARGINE SCH UNITS: 100 INJECTION, SOLUTION SUBCUTANEOUS at 09:29

## 2020-08-26 RX ADMIN — VANCOMYCIN HYDROCHLORIDE SCH MLS/HR: 1 INJECTION, SOLUTION INTRAVENOUS at 00:01

## 2020-08-26 RX ADMIN — HEPARIN SODIUM (PORCINE) LOCK FLUSH IV SOLN 100 UNIT/ML SCH UNITS: 100 SOLUTION at 09:30

## 2020-08-26 RX ADMIN — Medication PRN ML: at 01:02

## 2020-08-26 RX ADMIN — Medication PRN ML: at 13:30

## 2020-08-26 RX ADMIN — VANCOMYCIN HYDROCHLORIDE SCH MLS/HR: 1 INJECTION, SOLUTION INTRAVENOUS at 23:57

## 2020-08-26 RX ADMIN — Medication PRN ML: at 09:30

## 2020-08-27 RX ADMIN — VANCOMYCIN HYDROCHLORIDE SCH MLS/HR: 1 INJECTION, SOLUTION INTRAVENOUS at 23:55

## 2020-08-27 RX ADMIN — HEPARIN SODIUM (PORCINE) LOCK FLUSH IV SOLN 100 UNIT/ML SCH UNITS: 100 SOLUTION at 01:09

## 2020-08-27 RX ADMIN — HEPARIN SODIUM (PORCINE) LOCK FLUSH IV SOLN 100 UNIT/ML SCH UNITS: 100 SOLUTION at 17:51

## 2020-08-27 RX ADMIN — Medication PRN ML: at 12:23

## 2020-08-27 RX ADMIN — VANCOMYCIN HYDROCHLORIDE SCH MLS/HR: 1 INJECTION, SOLUTION INTRAVENOUS at 12:21

## 2020-08-27 RX ADMIN — Medication PRN ML: at 09:37

## 2020-08-27 RX ADMIN — Medication PRN ML: at 13:36

## 2020-08-27 RX ADMIN — INSULIN GLARGINE SCH UNITS: 100 INJECTION, SOLUTION SUBCUTANEOUS at 20:58

## 2020-08-27 RX ADMIN — Medication PRN ML: at 01:09

## 2020-08-27 RX ADMIN — Medication PRN ML: at 00:59

## 2020-08-27 RX ADMIN — INSULIN GLARGINE SCH UNITS: 100 INJECTION, SOLUTION SUBCUTANEOUS at 09:41

## 2020-08-27 RX ADMIN — Medication PRN ML: at 17:40

## 2020-08-27 RX ADMIN — HEPARIN SODIUM (PORCINE) LOCK FLUSH IV SOLN 100 UNIT/ML SCH UNITS: 100 SOLUTION at 13:37

## 2020-08-27 RX ADMIN — HEPARIN SODIUM (PORCINE) LOCK FLUSH IV SOLN 100 UNIT/ML SCH UNITS: 100 SOLUTION at 09:38

## 2020-08-28 RX ADMIN — Medication PRN ML: at 09:25

## 2020-08-28 RX ADMIN — Medication PRN ML: at 17:26

## 2020-08-28 RX ADMIN — HEPARIN SODIUM (PORCINE) LOCK FLUSH IV SOLN 100 UNIT/ML SCH UNITS: 100 SOLUTION at 14:22

## 2020-08-28 RX ADMIN — HEPARIN SODIUM (PORCINE) LOCK FLUSH IV SOLN 100 UNIT/ML SCH UNITS: 100 SOLUTION at 09:20

## 2020-08-28 RX ADMIN — Medication PRN ML: at 14:20

## 2020-08-28 RX ADMIN — HEPARIN SODIUM (PORCINE) LOCK FLUSH IV SOLN 100 UNIT/ML SCH UNITS: 100 SOLUTION at 17:40

## 2020-08-28 RX ADMIN — INSULIN GLARGINE SCH UNITS: 100 INJECTION, SOLUTION SUBCUTANEOUS at 09:14

## 2020-08-28 RX ADMIN — Medication PRN ML: at 13:13

## 2020-08-28 RX ADMIN — INSULIN GLARGINE SCH UNITS: 100 INJECTION, SOLUTION SUBCUTANEOUS at 21:08

## 2020-08-28 RX ADMIN — Medication PRN ML: at 01:01

## 2020-08-28 RX ADMIN — HEPARIN SODIUM (PORCINE) LOCK FLUSH IV SOLN 100 UNIT/ML SCH UNITS: 100 SOLUTION at 01:02

## 2020-08-28 RX ADMIN — Medication PRN ML: at 00:53

## 2020-08-28 RX ADMIN — Medication PRN ML: at 09:08

## 2020-08-28 RX ADMIN — VANCOMYCIN HYDROCHLORIDE SCH MLS/HR: 1 INJECTION, SOLUTION INTRAVENOUS at 13:09

## 2020-08-29 RX ADMIN — INSULIN GLARGINE SCH UNITS: 100 INJECTION, SOLUTION SUBCUTANEOUS at 21:32

## 2020-08-29 RX ADMIN — VANCOMYCIN HYDROCHLORIDE SCH MLS/HR: 1 INJECTION, SOLUTION INTRAVENOUS at 00:41

## 2020-08-29 RX ADMIN — INSULIN GLARGINE SCH UNITS: 100 INJECTION, SOLUTION SUBCUTANEOUS at 08:23

## 2020-08-29 RX ADMIN — VANCOMYCIN HYDROCHLORIDE SCH MLS/HR: 1 INJECTION, SOLUTION INTRAVENOUS at 11:49

## 2020-08-29 RX ADMIN — HEPARIN SODIUM (PORCINE) LOCK FLUSH IV SOLN 100 UNIT/ML SCH UNITS: 100 SOLUTION at 08:23

## 2020-08-29 RX ADMIN — HEPARIN SODIUM (PORCINE) LOCK FLUSH IV SOLN 100 UNIT/ML SCH UNITS: 100 SOLUTION at 16:39

## 2020-08-29 RX ADMIN — HEPARIN SODIUM (PORCINE) LOCK FLUSH IV SOLN 100 UNIT/ML SCH UNITS: 100 SOLUTION at 01:45

## 2020-08-29 RX ADMIN — Medication PRN ML: at 13:11

## 2020-08-29 RX ADMIN — Medication PRN ML: at 08:24

## 2020-08-29 RX ADMIN — HEPARIN SODIUM (PORCINE) LOCK FLUSH IV SOLN 100 UNIT/ML SCH UNITS: 100 SOLUTION at 13:11

## 2020-08-29 RX ADMIN — VANCOMYCIN HYDROCHLORIDE SCH MLS/HR: 1 INJECTION, SOLUTION INTRAVENOUS at 23:58

## 2020-08-30 RX ADMIN — INSULIN GLARGINE SCH UNITS: 100 INJECTION, SOLUTION SUBCUTANEOUS at 09:21

## 2020-08-30 RX ADMIN — HEPARIN SODIUM (PORCINE) LOCK FLUSH IV SOLN 100 UNIT/ML SCH UNITS: 100 SOLUTION at 01:11

## 2020-08-30 RX ADMIN — Medication PRN ML: at 09:20

## 2020-08-30 RX ADMIN — Medication PRN ML: at 00:59

## 2020-08-30 RX ADMIN — HEPARIN SODIUM (PORCINE) LOCK FLUSH IV SOLN 100 UNIT/ML SCH UNITS: 100 SOLUTION at 15:15

## 2020-08-30 RX ADMIN — HEPARIN SODIUM (PORCINE) LOCK FLUSH IV SOLN 100 UNIT/ML SCH UNITS: 100 SOLUTION at 16:54

## 2020-08-30 RX ADMIN — HEPARIN SODIUM (PORCINE) LOCK FLUSH IV SOLN 100 UNIT/ML SCH UNITS: 100 SOLUTION at 09:34

## 2020-08-30 RX ADMIN — Medication PRN ML: at 01:10

## 2020-08-30 RX ADMIN — INSULIN GLARGINE SCH UNITS: 100 INJECTION, SOLUTION SUBCUTANEOUS at 20:46

## 2020-08-31 RX ADMIN — Medication PRN ML: at 17:33

## 2020-08-31 RX ADMIN — Medication PRN ML: at 15:00

## 2020-08-31 RX ADMIN — HEPARIN SODIUM (PORCINE) LOCK FLUSH IV SOLN 100 UNIT/ML SCH UNITS: 100 SOLUTION at 08:58

## 2020-08-31 RX ADMIN — VANCOMYCIN HYDROCHLORIDE SCH: 1 INJECTION, SOLUTION INTRAVENOUS at 06:53

## 2020-08-31 RX ADMIN — INSULIN GLARGINE SCH UNITS: 100 INJECTION, SOLUTION SUBCUTANEOUS at 20:38

## 2020-08-31 RX ADMIN — HEPARIN SODIUM (PORCINE) LOCK FLUSH IV SOLN 100 UNIT/ML SCH UNITS: 100 SOLUTION at 15:01

## 2020-08-31 RX ADMIN — HEPARIN SODIUM (PORCINE) LOCK FLUSH IV SOLN 100 UNIT/ML SCH UNITS: 100 SOLUTION at 17:34

## 2020-08-31 RX ADMIN — Medication PRN ML: at 01:57

## 2020-08-31 RX ADMIN — HEPARIN SODIUM (PORCINE) LOCK FLUSH IV SOLN 100 UNIT/ML SCH UNITS: 100 SOLUTION at 01:59

## 2020-08-31 RX ADMIN — Medication PRN ML: at 00:19

## 2020-08-31 RX ADMIN — INSULIN GLARGINE SCH UNITS: 100 INJECTION, SOLUTION SUBCUTANEOUS at 09:41

## 2020-09-01 RX ADMIN — INSULIN GLARGINE SCH UNITS: 100 INJECTION, SOLUTION SUBCUTANEOUS at 21:41

## 2020-09-01 RX ADMIN — Medication PRN ML: at 08:32

## 2020-09-01 RX ADMIN — INSULIN GLARGINE SCH UNITS: 100 INJECTION, SOLUTION SUBCUTANEOUS at 08:17

## 2020-09-01 RX ADMIN — Medication PRN ML: at 14:36

## 2020-09-01 RX ADMIN — Medication PRN ML: at 13:30

## 2020-09-01 RX ADMIN — Medication PRN ML: at 15:54

## 2020-09-01 RX ADMIN — HEPARIN SODIUM (PORCINE) LOCK FLUSH IV SOLN 100 UNIT/ML SCH UNITS: 100 SOLUTION at 03:12

## 2020-09-01 RX ADMIN — METRONIDAZOLE SCH MLS/HR: 500 SOLUTION INTRAVENOUS at 15:48

## 2020-09-01 RX ADMIN — Medication PRN ML: at 03:10

## 2020-09-01 RX ADMIN — HEPARIN SODIUM (PORCINE) LOCK FLUSH IV SOLN 100 UNIT/ML SCH UNITS: 100 SOLUTION at 14:36

## 2020-09-01 RX ADMIN — HEPARIN SODIUM (PORCINE) LOCK FLUSH IV SOLN 100 UNIT/ML SCH UNITS: 100 SOLUTION at 08:33

## 2020-09-01 RX ADMIN — HEPARIN SODIUM (PORCINE) LOCK FLUSH IV SOLN 100 UNIT/ML SCH UNITS: 100 SOLUTION at 16:58

## 2020-09-02 RX ADMIN — METRONIDAZOLE SCH MLS/HR: 500 SOLUTION INTRAVENOUS at 08:25

## 2020-09-02 RX ADMIN — Medication PRN ML: at 17:14

## 2020-09-02 RX ADMIN — HEPARIN SODIUM (PORCINE) LOCK FLUSH IV SOLN 100 UNIT/ML SCH UNITS: 100 SOLUTION at 02:42

## 2020-09-02 RX ADMIN — Medication PRN ML: at 08:00

## 2020-09-02 RX ADMIN — Medication PRN ML: at 14:08

## 2020-09-02 RX ADMIN — INSULIN GLARGINE SCH UNITS: 100 INJECTION, SOLUTION SUBCUTANEOUS at 21:01

## 2020-09-02 RX ADMIN — Medication PRN ML: at 17:28

## 2020-09-02 RX ADMIN — INSULIN GLARGINE SCH UNITS: 100 INJECTION, SOLUTION SUBCUTANEOUS at 08:34

## 2020-09-02 RX ADMIN — HEPARIN SODIUM (PORCINE) LOCK FLUSH IV SOLN 100 UNIT/ML SCH UNITS: 100 SOLUTION at 14:09

## 2020-09-02 RX ADMIN — HEPARIN SODIUM (PORCINE) LOCK FLUSH IV SOLN 100 UNIT/ML SCH UNITS: 100 SOLUTION at 09:35

## 2020-09-02 RX ADMIN — Medication PRN ML: at 12:59

## 2020-09-02 RX ADMIN — METRONIDAZOLE SCH MLS/HR: 500 SOLUTION INTRAVENOUS at 16:08

## 2020-09-02 RX ADMIN — Medication PRN ML: at 00:23

## 2020-09-02 RX ADMIN — Medication PRN ML: at 02:42

## 2020-09-02 RX ADMIN — Medication PRN ML: at 16:09

## 2020-09-02 RX ADMIN — Medication PRN ML: at 09:35

## 2020-09-02 RX ADMIN — HEPARIN SODIUM (PORCINE) LOCK FLUSH IV SOLN 100 UNIT/ML SCH UNITS: 100 SOLUTION at 17:28

## 2020-09-02 RX ADMIN — Medication PRN ML: at 01:34

## 2020-09-02 RX ADMIN — METRONIDAZOLE SCH MLS/HR: 500 SOLUTION INTRAVENOUS at 00:30

## 2020-09-03 RX ADMIN — Medication PRN ML: at 08:26

## 2020-09-03 RX ADMIN — Medication PRN ML: at 17:15

## 2020-09-03 RX ADMIN — Medication PRN ML: at 16:07

## 2020-09-03 RX ADMIN — Medication PRN ML: at 17:11

## 2020-09-03 RX ADMIN — METRONIDAZOLE SCH MLS/HR: 500 SOLUTION INTRAVENOUS at 00:44

## 2020-09-03 RX ADMIN — HEPARIN SODIUM (PORCINE) LOCK FLUSH IV SOLN 100 UNIT/ML SCH UNITS: 100 SOLUTION at 03:08

## 2020-09-03 RX ADMIN — INSULIN GLARGINE SCH UNITS: 100 INJECTION, SOLUTION SUBCUTANEOUS at 20:26

## 2020-09-03 RX ADMIN — Medication PRN ML: at 14:43

## 2020-09-03 RX ADMIN — Medication PRN ML: at 09:47

## 2020-09-03 RX ADMIN — Medication PRN ML: at 09:39

## 2020-09-03 RX ADMIN — HEPARIN SODIUM (PORCINE) LOCK FLUSH IV SOLN 100 UNIT/ML SCH UNITS: 100 SOLUTION at 17:18

## 2020-09-03 RX ADMIN — INSULIN GLARGINE SCH UNITS: 100 INJECTION, SOLUTION SUBCUTANEOUS at 09:32

## 2020-09-03 RX ADMIN — Medication PRN ML: at 02:03

## 2020-09-03 RX ADMIN — HEPARIN SODIUM (PORCINE) LOCK FLUSH IV SOLN 100 UNIT/ML SCH UNITS: 100 SOLUTION at 09:48

## 2020-09-03 RX ADMIN — HEPARIN SODIUM (PORCINE) LOCK FLUSH IV SOLN 100 UNIT/ML SCH UNITS: 100 SOLUTION at 14:43

## 2020-09-03 RX ADMIN — METRONIDAZOLE SCH MLS/HR: 500 SOLUTION INTRAVENOUS at 16:04

## 2020-09-03 RX ADMIN — METRONIDAZOLE SCH MLS/HR: 500 SOLUTION INTRAVENOUS at 08:24

## 2020-09-04 RX ADMIN — Medication PRN ML: at 09:05

## 2020-09-04 RX ADMIN — METRONIDAZOLE SCH MLS/HR: 500 SOLUTION INTRAVENOUS at 16:51

## 2020-09-04 RX ADMIN — Medication PRN ML: at 17:58

## 2020-09-04 RX ADMIN — METRONIDAZOLE SCH MLS/HR: 500 SOLUTION INTRAVENOUS at 08:03

## 2020-09-04 RX ADMIN — Medication PRN ML: at 00:13

## 2020-09-04 RX ADMIN — Medication PRN ML: at 01:18

## 2020-09-04 RX ADMIN — METRONIDAZOLE SCH MLS/HR: 500 SOLUTION INTRAVENOUS at 00:13

## 2020-09-04 RX ADMIN — HEPARIN SODIUM (PORCINE) LOCK FLUSH IV SOLN 100 UNIT/ML SCH UNITS: 100 SOLUTION at 09:11

## 2020-09-04 RX ADMIN — HEPARIN SODIUM (PORCINE) LOCK FLUSH IV SOLN 100 UNIT/ML SCH UNITS: 100 SOLUTION at 01:18

## 2020-09-04 RX ADMIN — VANCOMYCIN HYDROCHLORIDE SCH MLS/HR: 1 INJECTION, SOLUTION INTRAVENOUS at 17:58

## 2020-09-04 RX ADMIN — Medication PRN ML: at 03:42

## 2020-09-04 RX ADMIN — HEPARIN SODIUM (PORCINE) LOCK FLUSH IV SOLN 100 UNIT/ML SCH UNITS: 100 SOLUTION at 18:58

## 2020-09-04 RX ADMIN — METRONIDAZOLE SCH MLS/HR: 500 SOLUTION INTRAVENOUS at 23:34

## 2020-09-04 RX ADMIN — Medication PRN ML: at 18:58

## 2020-09-04 RX ADMIN — Medication PRN ML: at 03:00

## 2020-09-04 RX ADMIN — INSULIN GLARGINE SCH UNITS: 100 INJECTION, SOLUTION SUBCUTANEOUS at 08:08

## 2020-09-04 RX ADMIN — VANCOMYCIN HYDROCHLORIDE SCH MLS/HR: 1 INJECTION, SOLUTION INTRAVENOUS at 03:00

## 2020-09-04 RX ADMIN — HEPARIN SODIUM (PORCINE) LOCK FLUSH IV SOLN 100 UNIT/ML PRN UNITS: 100 SOLUTION at 03:48

## 2020-09-04 RX ADMIN — Medication PRN ML: at 09:11

## 2020-09-04 RX ADMIN — Medication PRN ML: at 08:03

## 2020-09-04 RX ADMIN — Medication PRN ML: at 16:51

## 2020-09-04 RX ADMIN — INSULIN GLARGINE SCH UNITS: 100 INJECTION, SOLUTION SUBCUTANEOUS at 21:14

## 2020-09-05 RX ADMIN — INSULIN GLARGINE SCH UNITS: 100 INJECTION, SOLUTION SUBCUTANEOUS at 08:45

## 2020-09-05 RX ADMIN — Medication PRN ML: at 08:42

## 2020-09-05 RX ADMIN — VANCOMYCIN HYDROCHLORIDE SCH MLS/HR: 1 INJECTION, SOLUTION INTRAVENOUS at 09:45

## 2020-09-05 RX ADMIN — METRONIDAZOLE SCH MLS/HR: 500 SOLUTION INTRAVENOUS at 16:07

## 2020-09-05 RX ADMIN — Medication PRN ML: at 01:05

## 2020-09-05 RX ADMIN — Medication PRN ML: at 17:19

## 2020-09-05 RX ADMIN — INSULIN GLARGINE SCH UNITS: 100 INJECTION, SOLUTION SUBCUTANEOUS at 23:04

## 2020-09-05 RX ADMIN — Medication PRN ML: at 16:07

## 2020-09-05 RX ADMIN — HEPARIN SODIUM (PORCINE) LOCK FLUSH IV SOLN 100 UNIT/ML SCH UNITS: 100 SOLUTION at 10:45

## 2020-09-05 RX ADMIN — VANCOMYCIN HYDROCHLORIDE SCH: 1 INJECTION, SOLUTION INTRAVENOUS at 09:45

## 2020-09-05 RX ADMIN — Medication PRN ML: at 10:45

## 2020-09-05 RX ADMIN — Medication PRN ML: at 23:06

## 2020-09-05 RX ADMIN — METRONIDAZOLE SCH MLS/HR: 500 SOLUTION INTRAVENOUS at 08:42

## 2020-09-05 RX ADMIN — HEPARIN SODIUM (PORCINE) LOCK FLUSH IV SOLN 100 UNIT/ML SCH UNITS: 100 SOLUTION at 17:19

## 2020-09-05 RX ADMIN — METRONIDAZOLE SCH MLS/HR: 500 SOLUTION INTRAVENOUS at 23:06

## 2020-09-05 RX ADMIN — HEPARIN SODIUM (PORCINE) LOCK FLUSH IV SOLN 100 UNIT/ML SCH UNITS: 100 SOLUTION at 01:09

## 2020-09-05 RX ADMIN — Medication PRN ML: at 09:45

## 2020-09-06 RX ADMIN — VANCOMYCIN HYDROCHLORIDE SCH MLS/HR: 1 INJECTION, SOLUTION INTRAVENOUS at 14:13

## 2020-09-06 RX ADMIN — Medication PRN ML: at 16:30

## 2020-09-06 RX ADMIN — Medication PRN ML: at 00:09

## 2020-09-06 RX ADMIN — METRONIDAZOLE SCH MLS/HR: 500 SOLUTION INTRAVENOUS at 15:15

## 2020-09-06 RX ADMIN — VANCOMYCIN HYDROCHLORIDE SCH MLS/HR: 1 INJECTION, SOLUTION INTRAVENOUS at 00:08

## 2020-09-06 RX ADMIN — INSULIN GLARGINE SCH UNITS: 100 INJECTION, SOLUTION SUBCUTANEOUS at 08:45

## 2020-09-06 RX ADMIN — INSULIN GLARGINE SCH UNITS: 100 INJECTION, SOLUTION SUBCUTANEOUS at 22:12

## 2020-09-06 RX ADMIN — HEPARIN SODIUM (PORCINE) LOCK FLUSH IV SOLN 100 UNIT/ML SCH UNITS: 100 SOLUTION at 09:47

## 2020-09-06 RX ADMIN — Medication PRN ML: at 14:13

## 2020-09-06 RX ADMIN — HEPARIN SODIUM (PORCINE) LOCK FLUSH IV SOLN 100 UNIT/ML SCH UNITS: 100 SOLUTION at 16:30

## 2020-09-06 RX ADMIN — Medication PRN ML: at 08:36

## 2020-09-06 RX ADMIN — HEPARIN SODIUM (PORCINE) LOCK FLUSH IV SOLN 100 UNIT/ML SCH UNITS: 100 SOLUTION at 01:11

## 2020-09-06 RX ADMIN — METRONIDAZOLE SCH MLS/HR: 500 SOLUTION INTRAVENOUS at 08:36

## 2020-09-07 RX ADMIN — Medication PRN ML: at 17:25

## 2020-09-07 RX ADMIN — HEPARIN SODIUM (PORCINE) LOCK FLUSH IV SOLN 100 UNIT/ML SCH UNITS: 100 SOLUTION at 09:20

## 2020-09-07 RX ADMIN — METRONIDAZOLE SCH MLS/HR: 500 SOLUTION INTRAVENOUS at 08:20

## 2020-09-07 RX ADMIN — Medication PRN ML: at 08:20

## 2020-09-07 RX ADMIN — METRONIDAZOLE SCH MLS/HR: 500 SOLUTION INTRAVENOUS at 23:34

## 2020-09-07 RX ADMIN — VANCOMYCIN HYDROCHLORIDE SCH MLS/HR: 1 INJECTION, SOLUTION INTRAVENOUS at 17:44

## 2020-09-07 RX ADMIN — Medication PRN ML: at 09:20

## 2020-09-07 RX ADMIN — Medication PRN ML: at 16:21

## 2020-09-07 RX ADMIN — INSULIN GLARGINE SCH UNITS: 100 INJECTION, SOLUTION SUBCUTANEOUS at 21:15

## 2020-09-07 RX ADMIN — Medication PRN ML: at 00:46

## 2020-09-07 RX ADMIN — INSULIN GLARGINE SCH UNITS: 100 INJECTION, SOLUTION SUBCUTANEOUS at 08:26

## 2020-09-07 RX ADMIN — Medication PRN ML: at 19:23

## 2020-09-07 RX ADMIN — HEPARIN SODIUM (PORCINE) LOCK FLUSH IV SOLN 100 UNIT/ML PRN UNITS: 100 SOLUTION at 04:52

## 2020-09-07 RX ADMIN — Medication PRN ML: at 01:50

## 2020-09-07 RX ADMIN — HEPARIN SODIUM (PORCINE) LOCK FLUSH IV SOLN 100 UNIT/ML SCH UNITS: 100 SOLUTION at 01:50

## 2020-09-07 RX ADMIN — METRONIDAZOLE SCH MLS/HR: 500 SOLUTION INTRAVENOUS at 16:21

## 2020-09-07 RX ADMIN — HEPARIN SODIUM (PORCINE) LOCK FLUSH IV SOLN 100 UNIT/ML SCH UNITS: 100 SOLUTION at 19:23

## 2020-09-07 RX ADMIN — Medication PRN ML: at 04:52

## 2020-09-07 RX ADMIN — METRONIDAZOLE SCH MLS/HR: 500 SOLUTION INTRAVENOUS at 00:45

## 2020-09-07 RX ADMIN — Medication PRN ML: at 17:21

## 2020-09-07 RX ADMIN — VANCOMYCIN HYDROCHLORIDE SCH MLS/HR: 1 INJECTION, SOLUTION INTRAVENOUS at 03:50

## 2020-09-07 RX ADMIN — HEPARIN SODIUM (PORCINE) LOCK FLUSH IV SOLN 100 UNIT/ML SCH UNITS: 100 SOLUTION at 17:25

## 2020-09-08 RX ADMIN — METRONIDAZOLE SCH MLS/HR: 500 SOLUTION INTRAVENOUS at 07:26

## 2020-09-08 RX ADMIN — Medication PRN ML: at 09:44

## 2020-09-08 RX ADMIN — INSULIN GLARGINE SCH UNITS: 100 INJECTION, SOLUTION SUBCUTANEOUS at 08:48

## 2020-09-08 RX ADMIN — HEPARIN SODIUM (PORCINE) LOCK FLUSH IV SOLN 100 UNIT/ML SCH UNITS: 100 SOLUTION at 00:42

## 2020-09-08 RX ADMIN — METRONIDAZOLE SCH MLS/HR: 500 SOLUTION INTRAVENOUS at 15:59

## 2020-09-08 RX ADMIN — HEPARIN SODIUM (PORCINE) LOCK FLUSH IV SOLN 100 UNIT/ML PRN UNITS: 100 SOLUTION at 22:15

## 2020-09-08 RX ADMIN — INSULIN GLARGINE SCH UNITS: 100 INJECTION, SOLUTION SUBCUTANEOUS at 21:04

## 2020-09-08 RX ADMIN — HEPARIN SODIUM (PORCINE) LOCK FLUSH IV SOLN 100 UNIT/ML SCH UNITS: 100 SOLUTION at 17:23

## 2020-09-08 RX ADMIN — Medication PRN ML: at 00:50

## 2020-09-08 RX ADMIN — Medication PRN ML: at 17:15

## 2020-09-08 RX ADMIN — Medication PRN ML: at 16:04

## 2020-09-08 RX ADMIN — Medication PRN ML: at 09:53

## 2020-09-08 RX ADMIN — VANCOMYCIN HYDROCHLORIDE SCH MLS/HR: 1 INJECTION, SOLUTION INTRAVENOUS at 08:38

## 2020-09-08 RX ADMIN — Medication PRN ML: at 08:36

## 2020-09-08 RX ADMIN — HEPARIN SODIUM (PORCINE) LOCK FLUSH IV SOLN 100 UNIT/ML SCH UNITS: 100 SOLUTION at 09:53

## 2020-09-08 RX ADMIN — Medication PRN ML: at 17:22

## 2020-09-08 RX ADMIN — VANCOMYCIN HYDROCHLORIDE SCH MLS/HR: 1 INJECTION, SOLUTION INTRAVENOUS at 21:12

## 2020-09-09 RX ADMIN — Medication PRN ML: at 18:32

## 2020-09-09 RX ADMIN — INSULIN GLARGINE SCH UNITS: 100 INJECTION, SOLUTION SUBCUTANEOUS at 21:20

## 2020-09-09 RX ADMIN — VANCOMYCIN HYDROCHLORIDE SCH MLS/HR: 1 INJECTION, SOLUTION INTRAVENOUS at 12:34

## 2020-09-09 RX ADMIN — Medication PRN ML: at 09:43

## 2020-09-09 RX ADMIN — HEPARIN SODIUM (PORCINE) LOCK FLUSH IV SOLN 100 UNIT/ML SCH UNITS: 100 SOLUTION at 01:18

## 2020-09-09 RX ADMIN — HEPARIN SODIUM (PORCINE) LOCK FLUSH IV SOLN 100 UNIT/ML PRN UNITS: 100 SOLUTION at 13:37

## 2020-09-09 RX ADMIN — HEPARIN SODIUM (PORCINE) LOCK FLUSH IV SOLN 100 UNIT/ML SCH UNITS: 100 SOLUTION at 18:27

## 2020-09-09 RX ADMIN — METRONIDAZOLE SCH MLS/HR: 500 SOLUTION INTRAVENOUS at 08:32

## 2020-09-09 RX ADMIN — INSULIN GLARGINE SCH UNITS: 100 INJECTION, SOLUTION SUBCUTANEOUS at 09:47

## 2020-09-09 RX ADMIN — Medication PRN ML: at 12:34

## 2020-09-09 RX ADMIN — HEPARIN SODIUM (PORCINE) LOCK FLUSH IV SOLN 100 UNIT/ML SCH UNITS: 100 SOLUTION at 09:44

## 2020-09-09 RX ADMIN — Medication PRN ML: at 08:32

## 2020-09-09 RX ADMIN — METRONIDAZOLE SCH MLS/HR: 500 SOLUTION INTRAVENOUS at 00:07

## 2020-09-09 RX ADMIN — Medication PRN ML: at 13:35

## 2020-09-09 RX ADMIN — Medication PRN ML: at 01:18

## 2020-09-09 RX ADMIN — METRONIDAZOLE SCH MLS/HR: 500 SOLUTION INTRAVENOUS at 23:00

## 2020-09-09 RX ADMIN — METRONIDAZOLE SCH MLS/HR: 500 SOLUTION INTRAVENOUS at 16:37

## 2020-09-09 RX ADMIN — Medication PRN ML: at 16:36

## 2020-09-10 RX ADMIN — VANCOMYCIN HYDROCHLORIDE SCH MLS/HR: 1 INJECTION, SOLUTION INTRAVENOUS at 01:12

## 2020-09-10 RX ADMIN — METRONIDAZOLE SCH MLS/HR: 500 SOLUTION INTRAVENOUS at 15:40

## 2020-09-10 RX ADMIN — HEPARIN SODIUM (PORCINE) LOCK FLUSH IV SOLN 100 UNIT/ML SCH UNITS: 100 SOLUTION at 02:14

## 2020-09-10 RX ADMIN — METRONIDAZOLE SCH MLS/HR: 500 SOLUTION INTRAVENOUS at 23:56

## 2020-09-10 RX ADMIN — Medication PRN ML: at 18:06

## 2020-09-10 RX ADMIN — Medication PRN ML: at 08:45

## 2020-09-10 RX ADMIN — VANCOMYCIN HYDROCHLORIDE SCH MLS/HR: 1 INJECTION, SOLUTION INTRAVENOUS at 16:51

## 2020-09-10 RX ADMIN — Medication PRN ML: at 16:51

## 2020-09-10 RX ADMIN — HEPARIN SODIUM (PORCINE) LOCK FLUSH IV SOLN 100 UNIT/ML SCH UNITS: 100 SOLUTION at 00:40

## 2020-09-10 RX ADMIN — Medication PRN ML: at 07:28

## 2020-09-10 RX ADMIN — HEPARIN SODIUM (PORCINE) LOCK FLUSH IV SOLN 100 UNIT/ML SCH UNITS: 100 SOLUTION at 18:07

## 2020-09-10 RX ADMIN — Medication PRN ML: at 07:19

## 2020-09-10 RX ADMIN — METRONIDAZOLE SCH MLS/HR: 500 SOLUTION INTRAVENOUS at 07:27

## 2020-09-10 RX ADMIN — INSULIN GLARGINE SCH UNITS: 100 INJECTION, SOLUTION SUBCUTANEOUS at 21:56

## 2020-09-10 RX ADMIN — HEPARIN SODIUM (PORCINE) LOCK FLUSH IV SOLN 100 UNIT/ML SCH UNITS: 100 SOLUTION at 08:48

## 2020-09-10 RX ADMIN — Medication PRN ML: at 01:08

## 2020-09-10 RX ADMIN — INSULIN GLARGINE SCH UNITS: 100 INJECTION, SOLUTION SUBCUTANEOUS at 08:50

## 2020-09-10 RX ADMIN — Medication PRN ML: at 16:47

## 2020-09-10 RX ADMIN — Medication PRN ML: at 15:41

## 2020-09-10 NOTE — PN
DATE SEEN:  08/31/2020

 

HISTORY:  Shyam is a 49-year-old man with type 2 diabetes with diabetic

neuropathy and a recent partial amputation of his right 2nd toe due to

osteomyelitis and gangrene.  He is now in swing bed on IV antibiotics until

September 14th for the osteomyelitis.  He is tolerating this satisfactorily.  He

states he overall feels better.  His laboratory data has been stable, and

overall he is showing clinical improvement.

 

PHYSICAL EXAMINATION:  GENERAL:  He is alert and comfortable, examined lying and

sitting in his bed.  VITAL SIGNS:  Blood pressure 144/97, pulse is 78 and

regular, respirations 16, O2 saturation 97% on room air, temperature 98.  SKIN:

Shows multiple tattoos.  He has sutures in place to the 2nd toe on the right at

the site of partial amputation.  There is a small amount of devitalized tissue,

but a central pink area of the amputated stump with no drainage or inflammation.

LUNGS:  Clear.  HEART:  Regular.

 

LABORATORY DATA:  White count 4500, hemoglobin 12.3, platelets 180,000, glucose

148.  CRP less than 0.2.  Last vancomycin trough yesterday elevated.

 

ASSESSMENT:

1. Osteomyelitis of toe, stable and improving post amputation and on IV

    antibiotics.

2. Type 2 diabetes with diabetic peripheral vascular disease and neuropathy.

 

PLAN:  As mentioned, he will be on antibiotics until September 14th.  His

antibiotic care is directed by Infectious Disease in [place], along with dosing.

Continue current cares.

 

Job#: [id number]

DD: 08/31/2020 0859

DT: 08/31/2020 1046 CANDIE/FORTINO
DATE SEEN:  09/03/2020

 

HISTORY:  Mr. Garcia is a 49-year-old man from [place] who had a right distal 2nd

toe amputation due to osteomyelitis with gangrene.  He has been on IV

antibiotics for resistant bacteria that are due to go through September 14th as

directed by his Infectious Disease doctor in [place].  Shyam also has type 2

diabetes and hypertension.  There has been a problem with full compliance with

his medications such that his metronidazole pills and Norvasc pills have been

found on his bed sheets after witnessing them being taken such that suggesting

he spit them out again.  For this reason, his metronidazole was switched from

oral to intravenous.

 

PHYSICAL EXAMINATION:  GENERAL:  Today, he is alert and comfortable.

EXTREMITIES:  Toe distal tip shows moderate eschar devitalized tissue, but

slight pink at the very tip.  Eight sutures are removed without difficulty

today.  VITAL SIGNS:  Blood pressure 145/98, pulse 92, temperature 98.6,

respirations 20.

 

ASSESSMENT:

1. Osteomyelitis, right 2nd toe.

2. Type 2 diabetes.

3. Hypertension.

4. Poor compliance.

 

PLAN:  Continue his IV antibiotics through September 14th as scheduled and as

directed by his Infectious Disease doctor in [place].

 

I have discussed with Shyam the importance of taking his Norvasc daily for

hypertension.

 

Job#: [id number]

DD: 09/03/2020 0902

DT: 09/03/2020 1059 CANDIE/FORTINO
DATE SEEN:  09/04/2020

 

SUBJECTIVE:  Shyam Garcia is a 49-year-old  male admitted for long-term

antibiotic therapy.  Had a distal amputation, right foot.  Antibiotic therapy on

board, cefepime 2 g q.8 hours.  Antibiotic therapy in place, was switched from

IV to oral metronidazole due to lack of reliable intake orally.  Medications

reviewed and appropriate.  Vancomycin also on 1 g q.14 hours schedule.

 

No particular complaints.  Pain absent.

 

OBJECTIVE:  VITAL SIGNS:  36.7, 139/94, 18, 97.2.  GENERAL:  Appears

comfortable.  Speech was fluent.  CHEST:  Clear to all lung fields.  HEART:  No

ectopy or murmur.  ABDOMEN:  Benign.  EXTREMITIES:  Right toe dressed.

 

ASSESSMENT:  Osteomyelitis with complicated infection.

 

PLAN:  Continue antibiotics on board.  Present treatment in place.

 

Job#: [id number]

DD: 09/04/2020 1140

DT: 09/04/2020 1405 DEVEN/FORTINO
DATE SEEN:  09/05/2020

 

REASON FOR ADMISSION:  Complicated osteomyelitis.

 

SUBJECTIVE:  Shyam Garcia is a 49-year-old  male admitted for

antibiotic therapy for osteomyelitis.  Presently on Maxipime, metronidazole, and

vancomycin.  Pharmacy following __________

 

Pain by reports under control.

 

PHYSICAL EXAMINATION:  GENERAL:  Appears comfortable.

 

ASSESSMENT:  Complicated osteomyelitis.

 

PLAN:  Continue present therapy, duration 09/14/2020.

 

Job#: [id number]

DD: 09/06/2020 1001

DT: 09/06/2020 1300 DEVEN/FORTINO
DATE SEEN:  09/06/2020

 

SUBJECTIVE:  Shyam Garcia is a 49-year-old male in today for long-term

antibiotic therapy.  Three drug therapy for osteomyelitis.

 

Doing well.

 

Wound to be reviewed on Monday.

 

OBJECTIVE:  Exam was otherwise satisfactory.  Vital signs were stable.

 

LABORATORY STUDIES:  Glucose is moderately elevated 158, 193, 221, 213, 195.

 

PLAN:  On Lantus therapy only.  Continue supportive management.

 

Job#: [id number]

DD: 09/06/2020 1002

DT: 09/06/2020 1258 DEVEN/FORTINO
DATE SEEN:  09/07/2020

 

SUBJECTIVE:  Shyam Garcia is a 49-year-old  male for long-term

antibiotic use.  Expected 09/14/2020.  Presents with mild shortness of breath

and complicated cough, known emphysema.  Requests albuterol inhaler.

 

OBJECTIVE:  LUNGS:  Reasonably clear.

HEART:  Sounds distant.

ABDOMEN:  Benign.

 

ASSESSMENT:  Chronic obstructive pulmonary disease.

 

PLAN:  We will add albuterol inhaler per protocol.

 

Job#: [id number]

DD: 09/07/2020 1109

DT: 09/07/2020 1220 DEVEN/FORTINO
DATE SEEN:  09/09/2020

 

SUBJECTIVE:  Shyam Garcia is a 49-year-old  male in today for

antibiotic therapy for 4th partial toe amputation, right foot.  Doing well.

Pain is controlled.  Tolerating medications and care.

 

Has an upcoming appointment on 09/14/2020, Infectious Disease.  Voices no

particular complaints or concerns.

 

LABORATORY STUDIES:  Glucose is noted 164, 137, 149, 272, and 134.

 

Medications on board and appropriate.  Vital signs are otherwise stable.  I

viewed his toe, healing well with good interval healing.

 

ASSESSMENT:  Complicated osteomyelitis, 4th toe, right foot.

 

PLAN:  Medications and treatment on board.

 

Job#: [id number]

DD: 09/09/2020 1049

DT: 09/09/2020 1220 DEVEN/FORTINO
DATE SEEN:  09/10/2020

 

SUBJECTIVE:  Shyam Garcia is a 49-year-old  male, complicated diabetes.

Had a partial amputation of a single toe right foot.  Doing well.  Pain is

controlled.  Medications reviewed and appropriate, pharmacy is on board.

 

OBJECTIVE:  VITAL SIGNS:  36.7, 97, 138/97, 18, and 97%.  GENERAL:  Appears

well.  Busy on his phone.  NECK:  Benign.  CHEST:  Clear in all lung fields.

HEART:  No ectopy or murmur.  ABDOMEN:  Benign.  EXTREMITIES:  Toe was examined,

healing nicely.

 

ASSESSMENT:  Complicated osteomyelitis in toe, right foot.

 

PLAN:  Medications, care, and treatment on board.  Upcoming followup in

Infectious Disease, 09/14/2020.

 

Job#: [id number]

DD: 09/10/2020 0830

DT: 09/10/2020 1040 DEVEN/FORTINO
no dysuria, no frequency, and no hematuria.

## 2020-09-11 RX ADMIN — Medication PRN ML: at 23:59

## 2020-09-11 RX ADMIN — Medication PRN ML: at 09:11

## 2020-09-11 RX ADMIN — INSULIN GLARGINE SCH UNITS: 100 INJECTION, SOLUTION SUBCUTANEOUS at 21:00

## 2020-09-11 RX ADMIN — Medication PRN ML: at 05:48

## 2020-09-11 RX ADMIN — HEPARIN SODIUM (PORCINE) LOCK FLUSH IV SOLN 100 UNIT/ML SCH UNITS: 100 SOLUTION at 01:14

## 2020-09-11 RX ADMIN — Medication PRN ML: at 17:27

## 2020-09-11 RX ADMIN — HEPARIN SODIUM (PORCINE) LOCK FLUSH IV SOLN 100 UNIT/ML SCH UNITS: 100 SOLUTION at 09:11

## 2020-09-11 RX ADMIN — HEPARIN SODIUM (PORCINE) LOCK FLUSH IV SOLN 100 UNIT/ML PRN UNITS: 100 SOLUTION at 05:48

## 2020-09-11 RX ADMIN — VANCOMYCIN HYDROCHLORIDE SCH MLS/HR: 1 INJECTION, SOLUTION INTRAVENOUS at 04:54

## 2020-09-11 RX ADMIN — METRONIDAZOLE SCH MLS/HR: 500 SOLUTION INTRAVENOUS at 07:46

## 2020-09-11 RX ADMIN — INSULIN GLARGINE SCH UNITS: 100 INJECTION, SOLUTION SUBCUTANEOUS at 09:10

## 2020-09-11 RX ADMIN — HEPARIN SODIUM (PORCINE) LOCK FLUSH IV SOLN 100 UNIT/ML SCH UNITS: 100 SOLUTION at 19:05

## 2020-09-11 RX ADMIN — Medication PRN ML: at 15:51

## 2020-09-11 RX ADMIN — Medication PRN ML: at 01:04

## 2020-09-11 RX ADMIN — VANCOMYCIN HYDROCHLORIDE SCH MLS/HR: 1 INJECTION, SOLUTION INTRAVENOUS at 17:36

## 2020-09-11 RX ADMIN — METRONIDAZOLE SCH MLS/HR: 500 SOLUTION INTRAVENOUS at 15:51

## 2020-09-12 RX ADMIN — VANCOMYCIN HYDROCHLORIDE SCH MLS/HR: 1 INJECTION, SOLUTION INTRAVENOUS at 04:51

## 2020-09-12 RX ADMIN — Medication PRN ML: at 04:53

## 2020-09-12 RX ADMIN — INSULIN GLARGINE SCH UNITS: 100 INJECTION, SOLUTION SUBCUTANEOUS at 20:51

## 2020-09-12 RX ADMIN — METRONIDAZOLE SCH MLS/HR: 500 SOLUTION INTRAVENOUS at 00:02

## 2020-09-12 RX ADMIN — HEPARIN SODIUM (PORCINE) LOCK FLUSH IV SOLN 100 UNIT/ML SCH UNITS: 100 SOLUTION at 01:32

## 2020-09-12 RX ADMIN — INSULIN GLARGINE SCH UNITS: 100 INJECTION, SOLUTION SUBCUTANEOUS at 08:50

## 2020-09-12 RX ADMIN — Medication PRN ML: at 01:31

## 2020-09-12 RX ADMIN — Medication PRN ML: at 01:27

## 2020-09-12 RX ADMIN — HEPARIN SODIUM (PORCINE) LOCK FLUSH IV SOLN 100 UNIT/ML SCH UNITS: 100 SOLUTION at 08:51

## 2020-09-12 RX ADMIN — METRONIDAZOLE SCH MLS/HR: 500 SOLUTION INTRAVENOUS at 17:09

## 2020-09-12 RX ADMIN — METRONIDAZOLE SCH MLS/HR: 500 SOLUTION INTRAVENOUS at 07:42

## 2020-09-12 RX ADMIN — HEPARIN SODIUM (PORCINE) LOCK FLUSH IV SOLN 100 UNIT/ML SCH UNITS: 100 SOLUTION at 19:22

## 2020-09-12 RX ADMIN — Medication PRN ML: at 19:22

## 2020-09-12 RX ADMIN — VANCOMYCIN HYDROCHLORIDE SCH MLS/HR: 1 INJECTION, SOLUTION INTRAVENOUS at 18:08

## 2020-09-12 RX ADMIN — Medication PRN ML: at 18:04

## 2020-09-12 RX ADMIN — Medication PRN ML: at 07:42

## 2020-09-12 RX ADMIN — Medication PRN ML: at 08:51

## 2020-09-13 RX ADMIN — Medication PRN ML: at 01:36

## 2020-09-13 RX ADMIN — VANCOMYCIN HYDROCHLORIDE SCH MLS/HR: 1 INJECTION, SOLUTION INTRAVENOUS at 04:49

## 2020-09-13 RX ADMIN — METRONIDAZOLE SCH MLS/HR: 500 SOLUTION INTRAVENOUS at 00:05

## 2020-09-13 RX ADMIN — Medication PRN ML: at 00:06

## 2020-09-13 RX ADMIN — METRONIDAZOLE SCH MLS/HR: 500 SOLUTION INTRAVENOUS at 15:57

## 2020-09-13 RX ADMIN — HEPARIN SODIUM (PORCINE) LOCK FLUSH IV SOLN 100 UNIT/ML SCH UNITS: 100 SOLUTION at 09:09

## 2020-09-13 RX ADMIN — HEPARIN SODIUM (PORCINE) LOCK FLUSH IV SOLN 100 UNIT/ML SCH UNITS: 100 SOLUTION at 01:45

## 2020-09-13 RX ADMIN — METRONIDAZOLE SCH MLS/HR: 500 SOLUTION INTRAVENOUS at 23:53

## 2020-09-13 RX ADMIN — Medication PRN ML: at 18:17

## 2020-09-13 RX ADMIN — Medication PRN ML: at 07:57

## 2020-09-13 RX ADMIN — HEPARIN SODIUM (PORCINE) LOCK FLUSH IV SOLN 100 UNIT/ML SCH UNITS: 100 SOLUTION at 18:17

## 2020-09-13 RX ADMIN — Medication PRN ML: at 01:44

## 2020-09-13 RX ADMIN — METRONIDAZOLE SCH MLS/HR: 500 SOLUTION INTRAVENOUS at 07:57

## 2020-09-13 RX ADMIN — Medication PRN ML: at 09:09

## 2020-09-13 RX ADMIN — INSULIN GLARGINE SCH UNITS: 100 INJECTION, SOLUTION SUBCUTANEOUS at 20:56

## 2020-09-13 RX ADMIN — INSULIN GLARGINE SCH UNITS: 100 INJECTION, SOLUTION SUBCUTANEOUS at 08:00

## 2020-09-13 RX ADMIN — Medication PRN ML: at 17:00

## 2020-09-13 RX ADMIN — VANCOMYCIN HYDROCHLORIDE SCH MLS/HR: 1 INJECTION, SOLUTION INTRAVENOUS at 17:06

## 2020-09-14 RX ADMIN — HEPARIN SODIUM (PORCINE) LOCK FLUSH IV SOLN 100 UNIT/ML SCH: 100 SOLUTION at 18:44

## 2020-09-14 RX ADMIN — HEPARIN SODIUM (PORCINE) LOCK FLUSH IV SOLN 100 UNIT/ML SCH UNITS: 100 SOLUTION at 09:11

## 2020-09-14 RX ADMIN — METRONIDAZOLE SCH MLS/HR: 500 SOLUTION INTRAVENOUS at 16:17

## 2020-09-14 RX ADMIN — Medication PRN ML: at 01:29

## 2020-09-14 RX ADMIN — HEPARIN SODIUM (PORCINE) LOCK FLUSH IV SOLN 100 UNIT/ML SCH UNITS: 100 SOLUTION at 01:29

## 2020-09-14 RX ADMIN — METRONIDAZOLE SCH MLS/HR: 500 SOLUTION INTRAVENOUS at 08:06

## 2020-09-14 RX ADMIN — Medication PRN ML: at 00:57

## 2020-09-14 RX ADMIN — INSULIN GLARGINE SCH UNITS: 100 INJECTION, SOLUTION SUBCUTANEOUS at 09:12

## 2020-09-14 RX ADMIN — VANCOMYCIN HYDROCHLORIDE SCH MLS/HR: 1 INJECTION, SOLUTION INTRAVENOUS at 05:31

## 2020-09-14 RX ADMIN — VANCOMYCIN HYDROCHLORIDE SCH MLS/HR: 1 INJECTION, SOLUTION INTRAVENOUS at 17:26

## 2020-09-14 NOTE — PCM.DCSUM1
**Discharge Summary





- Hospital Course


HPI Initial Comments: 


Shyam present for swing bed admission for right 2nd toe osteomyelitis for IV 

antibiotics until Sept 14. He was admitted to [facility] on Aug 1st-12th, had

cut his right 2nd toenail the week prior to admission in [place], came in with 

swollen red toe. Started on Zosyn & Vancomycin, Ciprofloxacin & Flagyl in ER. Cr

was 0.81 on admission, climbed to 1.66, Vancomycin trough was 32.7 on 8/4, was 

getting 1250 mg every 8 hours since admission, dose was held on 8/5, random 

vanco trough was 14.7, current dose of Vancomycin 1250 mg every 24 hrs was 

started on 8/6 to present. Last vancomycin trough was today before 1149 dose, 

came back at 13.4. Goal initially was 15-20, then changed to 10-15 for his renal

function. Discharge paperwork states 15-20 goal but pharmacy will verify this. 

His creatinine today was 1.4. He underwent amputation of right 2nd toe on 8/3, 

dressing changed by podiatry today, has appt with them on 8/18 at 840am, 

dressing to stay in place until seen by podiatry. He is weight bearing with 

postop shoe. He was evaluated by PT/OT did not require any services. Follow up 

with Infectious disease on 9/2 at 1420. Also has nephrology appt on 8/19 at 1130

am and family medicine follow up on 8/19 at 1345. His last bowel movement was 

today. Eating and drinking well. Blood pressures were uncontrolled initially in 

[place] but controlled with Amlodipine and Coreg on discharge. Had PICC line 

placed at discharge by Infectious disease. Pain is controlled with 

Hydrocodone/APAP. 








- Discharge Data


Discharge Date: 09/14/20


Discharge Disposition: Home, Self-Care 01


Condition: Good





- Referral to Home Health


Primary Care Physician: 


PCP None








- Discharge Diagnosis/Problem(s)


(1) Osteomyelitis of second toe of right foot


SNOMED Code(s): 497117467, 921276544, 5127028938965419


   ICD Code: M86.9 - OSTEOMYELITIS, UNSPECIFIED   Status: Resolved   Current 

Visit: Yes   





(2) Status post amputation of toe of right foot


SNOMED Code(s): 90278686608705177, 07717496726737462


   ICD Code: Z89.421 - ACQUIRED ABSENCE OF OTHER RIGHT TOE(S)   Status: Chronic 

 Current Visit: Yes   Onset Date: ~08/03/20   





(3) Diabetes


SNOMED Code(s): 01665331


   ICD Code: E11.9 - TYPE 2 DIABETES MELLITUS WITHOUT COMPLICATIONS   Status: 

Chronic   Current Visit: Yes   


Qualifiers: 


   Diabetes mellitus type: type 2   Diabetes mellitus long term insulin use: 

unspecified long term insulin use status 





(4) History of alcohol dependence


SNOMED Code(s): 328287163


   ICD Code: F10.21 - ALCOHOL DEPENDENCE, IN REMISSION   Status: Chronic   

Current Visit: Yes   





(5) Acute kidney injury


SNOMED Code(s): 35081582, 17421968


   ICD Code: N17.9 - ACUTE KIDNEY FAILURE, UNSPECIFIED   Status: Resolved   

Current Visit: Yes   Onset Date: ~08/04/20   





(6) Hypertension


SNOMED Code(s): 72988680


   ICD Code: I10 - ESSENTIAL (PRIMARY) HYPERTENSION   Status: Chronic   Current 

Visit: Yes   





- Patient Summary/Data


Hospital Course: 


Completed 6 week course of Vancomycin and Flagyl, WBC 3.6, CRP <0.2. He has not 

required any Hydrocodone/APAP since admission. Had some low blood pressures 

initially so Coreg and Amlodipine was discontinued, his pressures started going 

up so Amlodipine was restarted. Blood sugars have been in 200s on Lantus 15 

units bid, will need meter with testing supplies to go home. Would like the 

[facility]. Scripts sent to [facility]. 





- Patient Instructions


Diet: Diabetic Diet


Activity: As Tolerated


Notify Provider of: Fever, Increased Pain, Nausea and/or Vomiting


Other/Special Instructions: Follow up with Family Practice in [place] in 1-2 weeks

to establish care for your diabetes.





- Discharge Plan


*PRESCRIPTION DRUG MONITORING PROGRAM REVIEWED*: No


Prescriptions/Med Rec: 


Insulin Glarg,Human.Rec.Analog [Lantus Solostar] 15 unit SQ BID #1 box


amLODIPine [Norvasc] 5 mg PO DAILY 30 Days #30 tablet


Acetaminophen [Tylenol] 650 mg PO Q4H PRN #30 tablet


 PRN Reason: Pain (Moderate 4-6)


Albuterol [Ventolin HFA] 2 puff INH Q4H PRN 30 Days #1 inhaler


 PRN Reason: Dyspnea


Home Medications: 


                                    Home Meds





Acetaminophen [Tylenol] 650 mg PO Q4H PRN #30 tablet 09/14/20 [Rx]


Albuterol [Ventolin HFA] 2 puff INH Q4H PRN 30 Days #1 inhaler 09/14/20 [Rx]


Insulin Glarg,Human.Rec.Analog [Lantus Solostar] 15 unit SQ BID #1 box 09/14/20 

[Rx]


amLODIPine [Norvasc] 5 mg PO DAILY 30 Days #30 tablet 09/14/20 [Rx]








Patient Handouts:  Insulin Treatment for Diabetes Mellitus, Osteomyelitis, 

Adult, Diabetes Mellitus and Skin Care, PICC Insertion, Care After, Toe 

Amputation, Care After, Fall Prevention in Hospitals, Adult, Insulin Glargine 

injection, Venous Thromboembolism Prevention





- Discharge Summary/Plan Comment


DC Time >30 min.: No





- General Info


Date of Service: 09/14/20


Subjective Update: 





Feeling well, labs normal today. Will complete his antibiotics later this 

afternoon. No fevers, chills. NO chest or abdominal pain. No nausea, vomiting or

 diarrhea.





- Patient Data


Vitals - Most Recent: 


                                Last Vital Signs











Temp  98.1 F   09/14/20 08:00


 


Pulse  71   09/14/20 08:00


 


Resp  17   09/14/20 08:00


 


BP  139/91 H  09/14/20 09:03


 


Pulse Ox  97   09/14/20 08:00











Weight - Most Recent: 184 lb 4 oz


I&O - Last 24 hours: 


                                 Intake & Output











 09/14/20 09/14/20 09/14/20





 06:59 14:59 22:59


 


Intake Total 300 93 


 


Balance 300 93 











Lab Results - Last 24 hrs: 


                         Laboratory Results - last 24 hr











  09/12/20 09/13/20 09/13/20 Range/Units





  12:50 05:08 17:33 


 


WBC     (4.5-12.0)  X10-3/uL


 


RBC     (4.30-5.75)  x10(6)uL


 


Hgb     (13.5-17.8)  g/dL


 


Hct     (30.0-51.3)  %


 


MCV     (80-96)  fL


 


MCH     (27.7-33.6)  pg


 


MCHC     (32.2-35.4)  g/dL


 


RDW     (11.5-15.5)  %


 


Plt Count     (125-369)  X10(3)uL


 


MPV     (7.4-10.4)  fL


 


Neut % (Auto)     (46-82)  %


 


Lymph % (Auto)     (13-37)  %


 


Mono % (Auto)     (4-12)  %


 


Eos % (Auto)     (1.0-5.0)  %


 


Baso % (Auto)     (0-2)  %


 


Neut # (Auto)     (1.6-8.3)  #


 


Lymph # (Auto)     (0.6-5.0)  #


 


Mono # (Auto)     (0.0-1.3)  #


 


Eos # (Auto)     (0.0-0.8)  #


 


Baso # (Auto)     (0.0-0.2)  #


 


POC Glucose  231 H  325 H  243 H  ()  mg/dL


 


ALT     (12-36)  U/L


 


C-Reactive Protein     (0.5-0.9)  mg/dL














  09/14/20 09/14/20 09/14/20 Range/Units





  05:35 06:25 06:25 


 


WBC   3.6 L   (4.5-12.0)  X10-3/uL


 


RBC   3.93 L   (4.30-5.75)  x10(6)uL


 


Hgb   12.1 L   (13.5-17.8)  g/dL


 


Hct   36.4   (30.0-51.3)  %


 


MCV   92.7   (80-96)  fL


 


MCH   30.8   (27.7-33.6)  pg


 


MCHC   33.2   (32.2-35.4)  g/dL


 


RDW   11.5   (11.5-15.5)  %


 


Plt Count   223   (125-369)  X10(3)uL


 


MPV   7.6   (7.4-10.4)  fL


 


Neut % (Auto)   45.5 L   (46-82)  %


 


Lymph % (Auto)   37.3 H   (13-37)  %


 


Mono % (Auto)   13.4 H   (4-12)  %


 


Eos % (Auto)   3   (1.0-5.0)  %


 


Baso % (Auto)   1   (0-2)  %


 


Neut # (Auto)   1.7   (1.6-8.3)  #


 


Lymph # (Auto)   1.3   (0.6-5.0)  #


 


Mono # (Auto)   0.5   (0.0-1.3)  #


 


Eos # (Auto)   0.1   (0.0-0.8)  #


 


Baso # (Auto)   0.0   (0.0-0.2)  #


 


POC Glucose  211 H    ()  mg/dL


 


ALT    28  D  (12-36)  U/L


 


C-Reactive Protein     (0.5-0.9)  mg/dL














  09/14/20 09/14/20 Range/Units





  06:25 12:56 


 


WBC    (4.5-12.0)  X10-3/uL


 


RBC    (4.30-5.75)  x10(6)uL


 


Hgb    (13.5-17.8)  g/dL


 


Hct    (30.0-51.3)  %


 


MCV    (80-96)  fL


 


MCH    (27.7-33.6)  pg


 


MCHC    (32.2-35.4)  g/dL


 


RDW    (11.5-15.5)  %


 


Plt Count    (125-369)  X10(3)uL


 


MPV    (7.4-10.4)  fL


 


Neut % (Auto)    (46-82)  %


 


Lymph % (Auto)    (13-37)  %


 


Mono % (Auto)    (4-12)  %


 


Eos % (Auto)    (1.0-5.0)  %


 


Baso % (Auto)    (0-2)  %


 


Neut # (Auto)    (1.6-8.3)  #


 


Lymph # (Auto)    (0.6-5.0)  #


 


Mono # (Auto)    (0.0-1.3)  #


 


Eos # (Auto)    (0.0-0.8)  #


 


Baso # (Auto)    (0.0-0.2)  #


 


POC Glucose   202 H  ()  mg/dL


 


ALT    (12-36)  U/L


 


C-Reactive Protein  < 0.2 L   (0.5-0.9)  mg/dL











Med Orders - Current: 


                               Current Medications





Acetaminophen (Tylenol)  650 mg PO Q4H PRN


   PRN Reason: Pain (moderate 4-6)


   Last Admin: 09/13/20 17:21 Dose:  650 mg


   Documented by: 


Albuterol (Ventolin Hfa)  0 gm INH Q4H PRN


   PRN Reason: Dyspnea


   Last Admin: 09/13/20 17:23 Dose:  2 puff


   Documented by: 


Amlodipine Besylate (Norvasc)  5 mg PO DAILY [facility]


   Last Admin: 09/14/20 09:03 Dose:  5 mg


   Documented by: 


Cefepime HCl (Maxipime)  2 gm IVPUSH Q8H [facility]


   Stop: 09/14/20 23:59


   Last Admin: 09/14/20 09:05 Dose:  2 gm


   Documented by: 


Heparin Sodium (Porcine) (Heparin Lock Flush 100 Units/Ml)  300 units IVPUSH 

ASDIRECTED PRN


   PRN Reason: FLUSH


   Last Admin: 09/11/20 05:48 Dose:  300 units


   Documented by: 


Heparin Sodium (Porcine) (Heparin Lock Flush 100 Units/Ml)  300 units IVPUSH Q8H

 [facility]


   Last Admin: 09/14/20 09:11 Dose:  300 units


   Documented by: 


Metronidazole (Flagyl 500 Mg In Ns 100 Ml)  100 mls @ 100 mls/hr IV Q8H [facility]


   Last Admin: 09/14/20 16:17 Dose:  100 mls/hr


   Documented by: 


Vancomycin HCl (Vancomycin 1 Gm/200 Ml In D5w)  200 mls @ 200 mls/hr IV Q12H [facility]


   Stop: 09/14/20 23:59


   Last Admin: 09/14/20 05:31 Dose:  200 mls/hr


   Documented by: 


Insulin Glargine (Lantus Solostar)  15 units SUBCUT BID [facility]


   Last Admin: 09/14/20 09:12 Dose:  15 units


   Documented by: 


Polyethylene Glycol (Miralax)  17 gm PO DAILY PRN


   PRN Reason: Constipation


Senna/Docusate Sodium (Senna Plus)  1 tab PO BID PRN


   PRN Reason: Constipation


Sodium Chloride (Saline Flush)  10 ml FLUSH ASDIRECTED PRN


   PRN Reason: IV Use


   Last Admin: 09/14/20 01:29 Dose:  10 ml


   Documented by: 


Vancomycin HCl (Pharmacy To Dose - Vancomycin)  1 dose .XX ASDIRECTED [facility]





Discontinued Medications





Hydrocodone Bitart/Acetaminophen (Norco 325-5 Mg)  1 tab PO Q6H PRN


   PRN Reason: Pain (severe 7-10)


   Last Admin: 08/12/20 20:57 Dose:  1 tab


   Documented by: 


Albuterol (Ventolin Hfa)  0 gm INH Q4H PRN


   PRN Reason: SHORTNESS OF BREATH


Amlodipine Besylate (Norvasc)  10 mg PO DAILY [facility]


   Last Admin: 08/23/20 08:14 Dose:  Not Given


   Documented by: 


Carvedilol (Coreg)  25 mg PO BIDMEALS [facility]


   Last Admin: 08/23/20 08:13 Dose:  Not Given


   Documented by: 


Heparin Sodium (Porcine) (Heparin Lock Flush 100 Units/Ml)  300 units IVPUSH Q8H

 [facility]


   Last Admin: 09/01/20 08:33 Dose:  300 units


   Documented by: 


Heparin Sodium (Porcine) (Heparin Lock Flush 100 Units/Ml)  300 units IVPUSH 

Q24H [facility]


   Last Admin: 08/13/20 14:29 Dose:  300 units


   Documented by: 


Heparin Sodium (Porcine) (Heparin Lock Flush 100 Units/Ml)  300 units IVPUSH 

Q24H [facility]


   Last Admin: 08/31/20 15:01 Dose:  300 units


   Documented by: 


Heparin Sodium (Porcine) (Heparin Lock Flush 100 Units/Ml)  300 units IVPUSH 

0200,0900,1400,1700 [facility]


   Last Admin: 09/04/20 01:18 Dose:  300 units


   Documented by: 


Vancomycin HCl 1.25 gm/ Sodium (Chloride)  250 mls @ 125 mls/hr IV Q24H [facility]


   Last Admin: 08/13/20 12:04 Dose:  125 mls/hr


   Documented by: 


Vancomycin HCl (Vancomycin 1.5 Gm/300 Ml Premix)  300 mls @ 200 mls/hr IV Q24H 

[facility]


   Last Admin: 08/24/20 12:49 Dose:  200 mls/hr


   Documented by: 


Vancomycin HCl (Vancomycin 1 Gm/200 Ml In D5w)  200 mls @ 200 mls/hr IV Q12H [facility]


   Stop: 09/14/20 23:59


   Last Admin: 08/31/20 06:53 Dose:  Not Given


   Documented by: 


Vancomycin HCl 750 mg/ Sodium (Chloride)  250 mls @ 250 mls/hr IV Q12H [facility]


   Stop: 09/14/20 23:59


   Last Admin: 09/03/20 13:47 Dose:  Not Given


   Documented by: 


Vancomycin HCl (Vancomycin 1 Gm/200 Ml In D5w)  200 mls @ 200 mls/hr IV ONETIME 

ONE


   Stop: 09/03/20 14:29


   Last Admin: 09/03/20 13:37 Dose:  200 mls/hr


   Documented by: 


Vancomycin HCl (Vancomycin 1 Gm/200 Ml In D5w)  200 mls @ 200 mls/hr IV Q14H [facility]


   Last Admin: 09/05/20 09:45 Dose:  Not Given


   Documented by: 


Vancomycin HCl (Vancomycin 1 Gm/200 Ml In D5w)  200 mls @ 200 mls/hr IV Q14H [facility]


   Last Admin: 09/10/20 01:12 Dose:  200 mls/hr


   Documented by: 


Metronidazole (Flagyl)  500 mg PO Q8H [facility]


   Stop: 09/14/20 23:59


   Last Admin: 09/01/20 08:20 Dose:  500 mg


   Documented by: 


Polyethylene Glycol (Miralax)  17 gm PO DAILY [facility]


   Last Admin: 08/13/20 08:47 Dose:  Not Given


   Documented by: 


Senna/Docusate Sodium (Senna Plus)  1 tab PO BID [facility]


   Last Admin: 08/13/20 08:43 Dose:  1 tab


   Documented by: 











- Exam


General: Reports: Alert, Oriented, Cooperative, No Acute Distress


Lungs: Reports: Clear to Auscultation, Normal Respiratory Effort


Cardiovascular: Reports: Regular Rate, Regular Rhythm


GI/Abdominal Exam: Normal Bowel Sounds, Soft, Non-Tender, No Distention


Extremities: No Pedal Edema


Skin: Reports: Warm, Dry, Intact

## 2022-03-08 ENCOUNTER — LAB SERVICES (OUTPATIENT)
Dept: LAB | Age: 52
End: 2022-03-08

## 2022-03-08 ENCOUNTER — OFFICE VISIT (OUTPATIENT)
Dept: INTERNAL MEDICINE | Age: 52
End: 2022-03-08

## 2022-03-08 VITALS
DIASTOLIC BLOOD PRESSURE: 80 MMHG | SYSTOLIC BLOOD PRESSURE: 130 MMHG | TEMPERATURE: 96.2 F | HEART RATE: 92 BPM | WEIGHT: 208.5 LBS | HEIGHT: 60 IN | OXYGEN SATURATION: 97 % | BODY MASS INDEX: 40.93 KG/M2

## 2022-03-08 DIAGNOSIS — Z79.4 TYPE 2 DIABETES MELLITUS WITH HYPERGLYCEMIA, WITH LONG-TERM CURRENT USE OF INSULIN (CMD): Primary | ICD-10-CM

## 2022-03-08 DIAGNOSIS — E11.65 TYPE 2 DIABETES MELLITUS WITH HYPERGLYCEMIA, WITH LONG-TERM CURRENT USE OF INSULIN (CMD): Primary | ICD-10-CM

## 2022-03-08 DIAGNOSIS — Z23 NEED FOR VACCINATION: ICD-10-CM

## 2022-03-08 DIAGNOSIS — E78.2 MIXED HYPERLIPIDEMIA: ICD-10-CM

## 2022-03-08 DIAGNOSIS — Z79.4 TYPE 2 DIABETES MELLITUS WITH HYPERGLYCEMIA, WITH LONG-TERM CURRENT USE OF INSULIN (CMD): ICD-10-CM

## 2022-03-08 DIAGNOSIS — E11.319 DIABETIC RETINOPATHY OF RIGHT EYE ASSOCIATED WITH TYPE 2 DIABETES MELLITUS, MACULAR EDEMA PRESENCE UNSPECIFIED, UNSPECIFIED RETINOPATHY SEVERITY (CMD): ICD-10-CM

## 2022-03-08 DIAGNOSIS — I10 PRIMARY HYPERTENSION: ICD-10-CM

## 2022-03-08 DIAGNOSIS — E11.65 TYPE 2 DIABETES MELLITUS WITH HYPERGLYCEMIA, WITH LONG-TERM CURRENT USE OF INSULIN (CMD): ICD-10-CM

## 2022-03-08 DIAGNOSIS — Z12.12 SCREENING FOR COLORECTAL CANCER: ICD-10-CM

## 2022-03-08 DIAGNOSIS — Z12.11 SCREENING FOR COLORECTAL CANCER: ICD-10-CM

## 2022-03-08 LAB
BASOPHILS # BLD: 0 K/MCL (ref 0–0.3)
BASOPHILS NFR BLD: 1 %
DEPRECATED RDW RBC: 39.9 FL (ref 39–50)
EOSINOPHIL # BLD: 0.1 K/MCL (ref 0–0.5)
EOSINOPHIL NFR BLD: 2 %
ERYTHROCYTE [DISTWIDTH] IN BLOOD: 11.6 % (ref 11–15)
HBA1C MFR BLD: 9.7 % (ref 4.5–5.6)
HCT VFR BLD CALC: 42.9 % (ref 39–51)
HGB BLD-MCNC: 14.2 G/DL (ref 13–17)
IMM GRANULOCYTES # BLD AUTO: 0 K/MCL (ref 0–0.2)
IMM GRANULOCYTES # BLD: 0 %
LYMPHOCYTES # BLD: 1.5 K/MCL (ref 1–4)
LYMPHOCYTES NFR BLD: 40 %
MCH RBC QN AUTO: 31.1 PG (ref 26–34)
MCHC RBC AUTO-ENTMCNC: 33.1 G/DL (ref 32–36.5)
MCV RBC AUTO: 94.1 FL (ref 78–100)
MONOCYTES # BLD: 0.4 K/MCL (ref 0.3–0.9)
MONOCYTES NFR BLD: 11 %
NEUTROPHILS # BLD: 1.7 K/MCL (ref 1.8–7.7)
NEUTROPHILS NFR BLD: 46 %
NRBC BLD MANUAL-RTO: 0 /100 WBC
PLATELET # BLD AUTO: 202 K/MCL (ref 140–450)
RBC # BLD: 4.56 MIL/MCL (ref 4.5–5.9)
WBC # BLD: 3.8 K/MCL (ref 4.2–11)

## 2022-03-08 PROCEDURE — 80053 COMPREHEN METABOLIC PANEL: CPT | Performed by: CLINICAL MEDICAL LABORATORY

## 2022-03-08 PROCEDURE — 82570 ASSAY OF URINE CREATININE: CPT | Performed by: CLINICAL MEDICAL LABORATORY

## 2022-03-08 PROCEDURE — 84153 ASSAY OF PSA TOTAL: CPT | Performed by: CLINICAL MEDICAL LABORATORY

## 2022-03-08 PROCEDURE — 85025 COMPLETE CBC W/AUTO DIFF WBC: CPT | Performed by: CLINICAL MEDICAL LABORATORY

## 2022-03-08 PROCEDURE — 84443 ASSAY THYROID STIM HORMONE: CPT | Performed by: CLINICAL MEDICAL LABORATORY

## 2022-03-08 PROCEDURE — 83036 HEMOGLOBIN GLYCOSYLATED A1C: CPT | Performed by: CLINICAL MEDICAL LABORATORY

## 2022-03-08 PROCEDURE — 82043 UR ALBUMIN QUANTITATIVE: CPT | Performed by: CLINICAL MEDICAL LABORATORY

## 2022-03-08 PROCEDURE — 36415 COLL VENOUS BLD VENIPUNCTURE: CPT | Performed by: INTERNAL MEDICINE

## 2022-03-08 PROCEDURE — 99204 OFFICE O/P NEW MOD 45 MIN: CPT | Performed by: INTERNAL MEDICINE

## 2022-03-08 RX ORDER — INSULIN GLARGINE 100 [IU]/ML
20 INJECTION, SOLUTION SUBCUTANEOUS DAILY
Qty: 15 ML | Refills: 3 | Status: SHIPPED | OUTPATIENT
Start: 2022-03-08

## 2022-03-08 RX ORDER — PRAVASTATIN SODIUM 40 MG
40 TABLET ORAL DAILY
Qty: 30 TABLET | Refills: 3 | Status: SHIPPED | OUTPATIENT
Start: 2022-03-08

## 2022-03-08 RX ORDER — PEN NEEDLE, DIABETIC 31 GX5/16"
NEEDLE, DISPOSABLE MISCELLANEOUS
Qty: 30 EACH | Refills: 3 | Status: SHIPPED | OUTPATIENT
Start: 2022-03-08

## 2022-03-08 RX ORDER — LOSARTAN POTASSIUM 25 MG/1
25 TABLET ORAL DAILY
Qty: 30 TABLET | Refills: 3 | Status: SHIPPED | OUTPATIENT
Start: 2022-03-08

## 2022-03-08 ASSESSMENT — PATIENT HEALTH QUESTIONNAIRE - PHQ9
2. FEELING DOWN, DEPRESSED OR HOPELESS: NOT AT ALL
SUM OF ALL RESPONSES TO PHQ9 QUESTIONS 1 AND 2: 0
SUM OF ALL RESPONSES TO PHQ9 QUESTIONS 1 AND 2: 0
1. LITTLE INTEREST OR PLEASURE IN DOING THINGS: NOT AT ALL
CLINICAL INTERPRETATION OF PHQ2 SCORE: NO FURTHER SCREENING NEEDED

## 2022-03-09 LAB
ALBUMIN SERPL-MCNC: 3.6 G/DL (ref 3.6–5.1)
ALBUMIN/GLOB SERPL: 1 {RATIO} (ref 1–2.4)
ALP SERPL-CCNC: 113 UNITS/L (ref 45–117)
ALT SERPL-CCNC: 29 UNITS/L
ANION GAP SERPL CALC-SCNC: 13 MMOL/L (ref 10–20)
AST SERPL-CCNC: 22 UNITS/L
BILIRUB SERPL-MCNC: 1 MG/DL (ref 0.2–1)
BUN SERPL-MCNC: 31 MG/DL (ref 6–20)
BUN/CREAT SERPL: 30 (ref 7–25)
CALCIUM SERPL-MCNC: 9.2 MG/DL (ref 8.4–10.2)
CHLORIDE SERPL-SCNC: 97 MMOL/L (ref 98–107)
CO2 SERPL-SCNC: 25 MMOL/L (ref 21–32)
CREAT SERPL-MCNC: 1.04 MG/DL (ref 0.67–1.17)
CREAT UR-MCNC: 105 MG/DL
FASTING DURATION TIME PATIENT: 4 HOURS (ref 0–999)
GFR SERPLBLD BASED ON 1.73 SQ M-ARVRAT: 83 ML/MIN
GLOBULIN SER-MCNC: 3.5 G/DL (ref 2–4)
GLUCOSE SERPL-MCNC: 406 MG/DL (ref 70–99)
MICROALBUMIN UR-MCNC: 146 MG/DL
MICROALBUMIN/CREAT UR: 1390.5 MG/G
POTASSIUM SERPL-SCNC: 4.4 MMOL/L (ref 3.4–5.1)
PROT SERPL-MCNC: 7.1 G/DL (ref 6.4–8.2)
PSA SERPL-MCNC: 0.49 NG/ML
SODIUM SERPL-SCNC: 131 MMOL/L (ref 135–145)
TSH SERPL-ACNC: 1.46 MCUNITS/ML (ref 0.35–5)

## 2022-03-10 ENCOUNTER — TELEPHONE (OUTPATIENT)
Dept: INTERNAL MEDICINE | Age: 52
End: 2022-03-10

## 2022-03-10 DIAGNOSIS — Z79.4 TYPE 2 DIABETES MELLITUS WITH HYPERGLYCEMIA, WITH LONG-TERM CURRENT USE OF INSULIN (CMD): Primary | ICD-10-CM

## 2022-03-10 DIAGNOSIS — E11.65 TYPE 2 DIABETES MELLITUS WITH HYPERGLYCEMIA, WITH LONG-TERM CURRENT USE OF INSULIN (CMD): Primary | ICD-10-CM

## 2022-03-10 RX ORDER — GLIMEPIRIDE 2 MG/1
2 TABLET ORAL
Qty: 30 TABLET | Refills: 3 | Status: SHIPPED | OUTPATIENT
Start: 2022-03-10

## 2022-03-11 ENCOUNTER — TELEPHONE (OUTPATIENT)
Dept: INTERNAL MEDICINE | Age: 52
End: 2022-03-11

## 2022-03-11 DIAGNOSIS — E11.65 TYPE 2 DIABETES MELLITUS WITH HYPERGLYCEMIA, WITH LONG-TERM CURRENT USE OF INSULIN (CMD): Primary | ICD-10-CM

## 2022-03-11 DIAGNOSIS — Z79.4 TYPE 2 DIABETES MELLITUS WITH HYPERGLYCEMIA, WITH LONG-TERM CURRENT USE OF INSULIN (CMD): Primary | ICD-10-CM

## 2022-03-11 RX ORDER — LANCING DEVICE
EACH MISCELLANEOUS
Qty: 1 KIT | Refills: 0 | Status: SHIPPED | OUTPATIENT
Start: 2022-03-11

## 2022-03-11 RX ORDER — LANCING DEVICE
EACH MISCELLANEOUS
Qty: 100 EACH | Refills: 3 | Status: SHIPPED | OUTPATIENT
Start: 2022-03-11

## 2022-06-15 ENCOUNTER — TELEPHONE (OUTPATIENT)
Dept: INTERNAL MEDICINE | Age: 52
End: 2022-06-15

## 2022-07-06 ENCOUNTER — TELEPHONE (OUTPATIENT)
Dept: ADMISSION | Age: 52
End: 2022-07-06

## 2022-09-16 ENCOUNTER — PATIENT OUTREACH (OUTPATIENT)
Dept: INTERNAL MEDICINE | Age: 52
End: 2022-09-16

## 2022-12-16 ENCOUNTER — PATIENT OUTREACH (OUTPATIENT)
Dept: INTERNAL MEDICINE | Age: 52
End: 2022-12-16

## 2023-02-02 ENCOUNTER — PATIENT OUTREACH (OUTPATIENT)
Dept: INTERNAL MEDICINE | Age: 53
End: 2023-02-02